# Patient Record
Sex: MALE | Race: WHITE
[De-identification: names, ages, dates, MRNs, and addresses within clinical notes are randomized per-mention and may not be internally consistent; named-entity substitution may affect disease eponyms.]

---

## 2023-01-19 ENCOUNTER — HOSPITAL ENCOUNTER (INPATIENT)
Dept: HOSPITAL 12 - ER | Age: 73
LOS: 19 days | Discharge: ANOTHER HOSPITAL | DRG: 885 | End: 2023-02-07
Payer: MEDICARE

## 2023-01-19 VITALS — BODY MASS INDEX: 14.7 KG/M2 | WEIGHT: 97 LBS | HEIGHT: 68 IN

## 2023-01-19 VITALS — SYSTOLIC BLOOD PRESSURE: 98 MMHG | DIASTOLIC BLOOD PRESSURE: 64 MMHG

## 2023-01-19 DIAGNOSIS — Z91.51: ICD-10-CM

## 2023-01-19 DIAGNOSIS — R94.6: ICD-10-CM

## 2023-01-19 DIAGNOSIS — F31.5: Primary | ICD-10-CM

## 2023-01-19 DIAGNOSIS — F41.1: ICD-10-CM

## 2023-01-19 DIAGNOSIS — R45.851: ICD-10-CM

## 2023-01-19 DIAGNOSIS — Z87.828: ICD-10-CM

## 2023-01-19 DIAGNOSIS — E86.0: ICD-10-CM

## 2023-01-19 DIAGNOSIS — Z79.899: ICD-10-CM

## 2023-01-19 DIAGNOSIS — D64.9: ICD-10-CM

## 2023-01-19 DIAGNOSIS — E83.39: ICD-10-CM

## 2023-01-19 DIAGNOSIS — M89.8X9: ICD-10-CM

## 2023-01-19 DIAGNOSIS — R41.9: ICD-10-CM

## 2023-01-19 DIAGNOSIS — E44.0: ICD-10-CM
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
## 2023-01-19 DIAGNOSIS — Z88.0: ICD-10-CM

## 2023-01-19 DIAGNOSIS — N17.9: ICD-10-CM

## 2023-01-19 DIAGNOSIS — F19.10: ICD-10-CM

## 2023-01-19 DIAGNOSIS — Z20.822: ICD-10-CM

## 2023-01-19 DIAGNOSIS — R62.7: ICD-10-CM

## 2023-01-19 DIAGNOSIS — T43.505A: ICD-10-CM

## 2023-01-19 DIAGNOSIS — F10.10: ICD-10-CM

## 2023-01-19 DIAGNOSIS — J44.9: ICD-10-CM

## 2023-01-19 DIAGNOSIS — I95.2: ICD-10-CM

## 2023-01-19 DIAGNOSIS — K21.9: ICD-10-CM

## 2023-01-19 DIAGNOSIS — Z91.14: ICD-10-CM

## 2023-01-19 DIAGNOSIS — Z87.19: ICD-10-CM

## 2023-01-19 DIAGNOSIS — Z86.19: ICD-10-CM

## 2023-01-19 LAB
ALP SERPL-CCNC: 60 U/L (ref 50–136)
ALT SERPL W/O P-5'-P-CCNC: 12 U/L (ref 16–63)
APAP SERPL-MCNC: < 10 UG/ML (ref 10–30)
AST SERPL-CCNC: 12 U/L (ref 15–37)
BILIRUB SERPL-MCNC: 1.3 MG/DL (ref 0.2–1)
BUN SERPL-MCNC: 19 MG/DL (ref 7–18)
CHLORIDE SERPL-SCNC: 103 MMOL/L (ref 98–107)
CO2 SERPL-SCNC: 29 MMOL/L (ref 21–32)
CREAT SERPL-MCNC: 0.9 MG/DL (ref 0.6–1.3)
ETHANOL SERPL-MCNC: < 3 MG/DL (ref 0–0)
GLUCOSE SERPL-MCNC: 100 MG/DL (ref 74–106)
HCT VFR BLD AUTO: 33.4 % (ref 36.7–47.1)
MCH RBC QN AUTO: 30.4 UUG (ref 23.8–33.4)
MCV RBC AUTO: 88.9 FL (ref 73–96.2)
PLATELET # BLD AUTO: 168 K/UL (ref 152–348)
POTASSIUM SERPL-SCNC: 4 MMOL/L (ref 3.5–5.1)
WS STN SPEC: 6.6 G/DL (ref 6.4–8.2)

## 2023-01-19 PROCEDURE — A6209 FOAM DRSG <=16 SQ IN W/O BDR: HCPCS

## 2023-01-19 PROCEDURE — A6213 FOAM DRG >16<=48 SQ IN W/BDR: HCPCS

## 2023-01-19 PROCEDURE — A4663 DIALYSIS BLOOD PRESSURE CUFF: HCPCS

## 2023-01-19 PROCEDURE — G0480 DRUG TEST DEF 1-7 CLASSES: HCPCS

## 2023-01-19 NOTE — NUR
Patient taken to MHU via wheelchair in stable condition with personal 
belongings. Patient in stable conditions, no signs of distress. Pilar KIM aware 
of patients arrival.

## 2023-01-20 VITALS — SYSTOLIC BLOOD PRESSURE: 102 MMHG | DIASTOLIC BLOOD PRESSURE: 59 MMHG

## 2023-01-20 VITALS — SYSTOLIC BLOOD PRESSURE: 95 MMHG | DIASTOLIC BLOOD PRESSURE: 73 MMHG

## 2023-01-20 VITALS — DIASTOLIC BLOOD PRESSURE: 58 MMHG | SYSTOLIC BLOOD PRESSURE: 92 MMHG

## 2023-01-20 LAB
CHOLEST SERPL-MCNC: 174 MG/DL (ref ?–200)
HDLC SERPL-MCNC: 49 MG/DL (ref 40–60)
TRIGL SERPL-MCNC: 86 MG/DL (ref 30–150)

## 2023-01-20 RX ADMIN — FERROUS SULFATE TAB 325 MG (65 MG ELEMENTAL FE) SCH MG: 325 (65 FE) TAB at 20:24

## 2023-01-20 RX ADMIN — DIVALPROEX SODIUM SCH MG: 125 CAPSULE ORAL at 16:46

## 2023-01-20 RX ADMIN — Medication SCH MG: at 12:49

## 2023-01-20 RX ADMIN — DIVALPROEX SODIUM SCH MG: 125 CAPSULE ORAL at 12:48

## 2023-01-20 NOTE — NUR
ADMISSION NOTE:

ADMITTED EARLIER A 72 YEARS OLD MALE TO [facility] ON A 5150 FOR DTS. PATIENT LIVES 
AT AN ASSISTED LIVING CALLED "[facility]" IN [place]. PER RECORDS, PATIENTS WAS 
TRANSPORTED TO [facility] BY FACILITY D/T DEPRESSION PSYCHOSIS AND SI. PER HOLD, 
PATIENT IS DEPRESSED WITH FLAT AFFECT AND HE THINKS THAT HE HAS BOMB INSIDE HIM. HE IS 
SUICIDAL WITH A PLAN TO OD. HIS HOLD WILL  ON 23 AT 0015.

UPON ADMISSION PATIENT IS A/O X 2 TO 3. HE REFLECTS WHAT IS WRITTEN ON THE HOLD. PATIENT 
STATED, "I AM SAD AND DEPRESSED, I HEAR VOICES BUT I AM NOT SURE WHAT THEY ARE SAYING. I 
DON'T WANT TO HARM MYSELF. I AM SAD". PATIENT IS ABLE TO VERBALLY CFS. HIS DENIAL IS 
CONVINCING.

FACE TO FACE ASSESSMENT WAS DONE, PATIENT WAS GIVEN HIS ADVISEMENT AS WELL AS HIS BOOKLET 
FOR PATIENT'S RIGHTS WHEN IN MENTAL HEALTH FACILITIES. 

PATIENT WAS COOPERATIVE WITH THE ADMISSION PROCESS. HE WAS INFORMED OF THE UNIT RULES, ROOM 
AND ROOMMATE. WILL CONTINUE TO MONITOR.

## 2023-01-20 NOTE — NUR
Firearms Report:



 completed and submitted a [employer] firearms report for 5150 a danger to himself. A 
copy of report has been placed in patient chart.

## 2023-01-20 NOTE — NUR
SAUL Initial Discharge Note:



Pt currently resides at an assisted living located at [address] [place] [address] ([phone]). SAUL will continue to work on 

contacting pt's assisted living to discuss pt's discharge plan. SAUL will continue to work 
with pt, family and MD to ensure a safe and proper discharge plan.

## 2023-01-20 NOTE — NUR
RECEIVED PT IN ROOM ISOLATIVE WITH A FLAT AFFECTIVE DENIES SUCIDAL IDEATION  BUT STAYS ONLY 
IN HIS ROOM NO SIGNS OF DISTRESS NOTED DENIES PAIN. BUT IS MEDICATION COMPLIANT.

## 2023-01-21 VITALS — SYSTOLIC BLOOD PRESSURE: 98 MMHG | DIASTOLIC BLOOD PRESSURE: 63 MMHG

## 2023-01-21 VITALS — SYSTOLIC BLOOD PRESSURE: 110 MMHG | DIASTOLIC BLOOD PRESSURE: 54 MMHG

## 2023-01-21 VITALS — DIASTOLIC BLOOD PRESSURE: 63 MMHG | SYSTOLIC BLOOD PRESSURE: 92 MMHG

## 2023-01-21 LAB
BUN SERPL-MCNC: 25 MG/DL (ref 7–18)
CHLORIDE SERPL-SCNC: 102 MMOL/L (ref 98–107)
CO2 SERPL-SCNC: 26 MMOL/L (ref 21–32)
CREAT SERPL-MCNC: 0.9 MG/DL (ref 0.6–1.3)
GLUCOSE SERPL-MCNC: 73 MG/DL (ref 74–106)
HCT VFR BLD AUTO: 35.3 % (ref 36.7–47.1)
MAGNESIUM SERPL-MCNC: 2.2 MG/DL (ref 1.8–2.4)
MCH RBC QN AUTO: 29.8 UUG (ref 23.8–33.4)
MCV RBC AUTO: 90.4 FL (ref 73–96.2)
PHOSPHATE SERPL-MCNC: 2.7 MG/DL (ref 2.5–4.9)
PLATELET # BLD AUTO: 54 K/UL (ref 152–348)
POTASSIUM SERPL-SCNC: 3.9 MMOL/L (ref 3.5–5.1)
TSH SERPL DL<=0.005 MIU/L-ACNC: 5.42 MIU/ML (ref 0.36–3.74)

## 2023-01-21 RX ADMIN — PANTOPRAZOLE SODIUM SCH MG: 40 TABLET, DELAYED RELEASE ORAL at 06:03

## 2023-01-21 RX ADMIN — DIVALPROEX SODIUM SCH MG: 125 CAPSULE ORAL at 08:22

## 2023-01-21 RX ADMIN — Medication SCH ML: at 16:19

## 2023-01-21 RX ADMIN — DIVALPROEX SODIUM SCH MG: 125 CAPSULE ORAL at 16:18

## 2023-01-21 RX ADMIN — Medication SCH MG: at 12:12

## 2023-01-21 RX ADMIN — DIVALPROEX SODIUM SCH MG: 125 CAPSULE ORAL at 12:12

## 2023-01-21 RX ADMIN — THERA TABS SCH UDTAB: TAB at 08:22

## 2023-01-21 RX ADMIN — FERROUS SULFATE TAB 325 MG (65 MG ELEMENTAL FE) SCH MG: 325 (65 FE) TAB at 21:32

## 2023-01-21 RX ADMIN — FERROUS SULFATE TAB 325 MG (65 MG ELEMENTAL FE) SCH MG: 325 (65 FE) TAB at 08:22

## 2023-01-21 NOTE — NUR
RECEIVED PATIENT IN HIS ROOM IN BED. HE IS NOTED AWAKE A/O X 2 TO 3. HIS MOOD IS LOW, AFFECT 
IS FLAT. HE APPEARS DEPRESSED. HE IS WITHDRAWN AND ISOLATIVE. PATIENT IS GUARDED. MINIMAL 
WORDS AND INTERACTION WITH THIS WRITER. HOWEVER, HE IS ABLE TO MAKE EYE CONTACT AND WHEN 
ASKED IF HE WAS HAVING SI HE STATED, "NO". HE ALSO DENIED HI/VH/AH. PATIENT IS REASSURED FOR 
HIS SAFETY. SAFETY AND FALL PRECAUTIONS ARE IN PLACE. HIS V/S ARE STABLE. HE WAS GIVEN PO 
FLUIDS AND SNACKS, BUT HE ONLY HAD FEW SIPS OF ENSURE. PATIENT WAS ENCOURAGE TO EAT MORE AND 
TO GET OUT OF THE ROOM AND WALK AROUND AND PARTICIPATE IN ACTIVITIES. ALL HIS NEEDS ARE MET. 
WILL CONTINUE TO MONITOR.

## 2023-01-21 NOTE — NUR
patirnt remain calm and cooperative with meds and care. slept 7.15 hrs through the night. no 
agitation noted. resting in bed comfortably. continue plan of care.

## 2023-01-21 NOTE — NUR
GPS: Nursing Notes: Mood Disturbance: Depression:

Patient is awake and responding to his name, isolative and withdrawn in his room, no 
interactions with peers, low energy level, compliant with his medications, poor appetite, 
resistant with nursing care, unable to formulate a viable plan for self care, verbally 
katie for safety, denies SI at this time, continue to monitor for safety, refusing to 
participate in therapeutic groups, continue with treatment plan.l

## 2023-01-22 VITALS — SYSTOLIC BLOOD PRESSURE: 90 MMHG | DIASTOLIC BLOOD PRESSURE: 62 MMHG

## 2023-01-22 VITALS — SYSTOLIC BLOOD PRESSURE: 92 MMHG | DIASTOLIC BLOOD PRESSURE: 54 MMHG

## 2023-01-22 VITALS — SYSTOLIC BLOOD PRESSURE: 90 MMHG | DIASTOLIC BLOOD PRESSURE: 52 MMHG

## 2023-01-22 LAB
BUN SERPL-MCNC: 29 MG/DL (ref 7–18)
CHLORIDE SERPL-SCNC: 103 MMOL/L (ref 98–107)
CO2 SERPL-SCNC: 26 MMOL/L (ref 21–32)
CREAT SERPL-MCNC: 1.1 MG/DL (ref 0.6–1.3)
GLUCOSE SERPL-MCNC: 80 MG/DL (ref 74–106)
HCT VFR BLD AUTO: 30.8 % (ref 36.7–47.1)
MAGNESIUM SERPL-MCNC: 2.3 MG/DL (ref 1.8–2.4)
MCH RBC QN AUTO: 29.9 UUG (ref 23.8–33.4)
MCV RBC AUTO: 90.2 FL (ref 73–96.2)
PHOSPHATE SERPL-MCNC: 2.7 MG/DL (ref 2.5–4.9)
PLATELET # BLD AUTO: 214 K/UL (ref 152–348)
POTASSIUM SERPL-SCNC: 4 MMOL/L (ref 3.5–5.1)

## 2023-01-22 RX ADMIN — DIVALPROEX SODIUM SCH MG: 125 CAPSULE ORAL at 13:10

## 2023-01-22 RX ADMIN — Medication SCH ML: at 08:37

## 2023-01-22 RX ADMIN — MAGNESIUM HYDROXIDE PRN ML: 400 SUSPENSION ORAL at 16:32

## 2023-01-22 RX ADMIN — Medication SCH MG: at 13:10

## 2023-01-22 RX ADMIN — FERROUS SULFATE TAB 325 MG (65 MG ELEMENTAL FE) SCH MG: 325 (65 FE) TAB at 20:17

## 2023-01-22 RX ADMIN — Medication PRN MG: at 16:32

## 2023-01-22 RX ADMIN — Medication SCH ML: at 16:32

## 2023-01-22 RX ADMIN — DIVALPROEX SODIUM SCH MG: 125 CAPSULE ORAL at 08:36

## 2023-01-22 RX ADMIN — PANTOPRAZOLE SODIUM SCH MG: 40 TABLET, DELAYED RELEASE ORAL at 06:16

## 2023-01-22 RX ADMIN — DIVALPROEX SODIUM SCH MG: 125 CAPSULE ORAL at 16:32

## 2023-01-22 RX ADMIN — FERROUS SULFATE TAB 325 MG (65 MG ELEMENTAL FE) SCH MG: 325 (65 FE) TAB at 08:36

## 2023-01-22 RX ADMIN — THERA TABS SCH UDTAB: TAB at 08:36

## 2023-01-22 NOTE — NUR
GPS: Nursing Notes: Mood Disturbance: Depression:

Patient is awake and responding to his name, depressed mood and flat affect, isolative and 
withdrawn in his room, low energy level, poor appetite, no interactions with staff or peers, 
resistant with nursing care, poor grooming, unkempt appearance, unable to formulate a viable 
plan for self care, compliant with his medications, denies SI, continue to monitor for 
safety, continue with treatment plan.

## 2023-01-22 NOTE — NUR
RECEIVED PATIENT IN HIS ROOM IN BED. HE IS NOTED AWAKE A/O X 2 TO 3. HE IS ABLE TO 
VERBALIZED HIS FEELINGS. HE APPEARS DEPRESSED. HE CONTINUE WITHDRAWN AND ISOLATIVE. PATIENT 
IS SUSPICIOUS, POOR APPETITE, IMPAIRED JUDGMENT. WHEN GIVEN ENSURED HE STATED, "THIS ENSURE 
IS NOT SAFE TO DRINK". PATIENT WAS REDIRECTED AND ORIENTED TO REALITY, YET REFUSED TO DRINK 
EVEN WHEN ANOTHER ENSURE WERE OFFERED. HIS AFFECT IS FLAT, MOOD IS LOW. PATIENT STATED THAT 
HE DOES HEAR VOICES BUT HE CAN'T TELL WHAT THEY ARE SAYING.  HE IS REASSURED FOR HIS SAFETY. 
SAFETY AND FALL PRECAUTIONS ARE IN PLACE. HIS V/S ARE STABLE. HE WAS GIVEN PO FLUIDS AND 
SNACKS, BUT HE ONLY HAD ONE CRACKER. ALL HIS NEEDS ARE MET. WILL CONTINUE TO MONITOR.

## 2023-01-23 VITALS — DIASTOLIC BLOOD PRESSURE: 58 MMHG | SYSTOLIC BLOOD PRESSURE: 91 MMHG

## 2023-01-23 VITALS — SYSTOLIC BLOOD PRESSURE: 95 MMHG | DIASTOLIC BLOOD PRESSURE: 55 MMHG

## 2023-01-23 VITALS — SYSTOLIC BLOOD PRESSURE: 117 MMHG | DIASTOLIC BLOOD PRESSURE: 96 MMHG

## 2023-01-23 LAB
ALP SERPL-CCNC: 62 U/L (ref 50–136)
ALT SERPL W/O P-5'-P-CCNC: 15 U/L (ref 16–63)
AST SERPL-CCNC: 14 U/L (ref 15–37)
BILIRUB SERPL-MCNC: 1 MG/DL (ref 0.2–1)
BUN SERPL-MCNC: 24 MG/DL (ref 7–18)
CHLORIDE SERPL-SCNC: 101 MMOL/L (ref 98–107)
CO2 SERPL-SCNC: 19 MMOL/L (ref 21–32)
CREAT SERPL-MCNC: 1.2 MG/DL (ref 0.6–1.3)
GLUCOSE SERPL-MCNC: 89 MG/DL (ref 74–106)
MAGNESIUM SERPL-MCNC: 2.6 MG/DL (ref 1.8–2.4)
PHOSPHATE SERPL-MCNC: 2.8 MG/DL (ref 2.5–4.9)
POTASSIUM SERPL-SCNC: 3.8 MMOL/L (ref 3.5–5.1)
WS STN SPEC: 7.1 G/DL (ref 6.4–8.2)

## 2023-01-23 RX ADMIN — FERROUS SULFATE TAB 325 MG (65 MG ELEMENTAL FE) SCH MG: 325 (65 FE) TAB at 20:36

## 2023-01-23 RX ADMIN — THERA TABS SCH UDTAB: TAB at 08:40

## 2023-01-23 RX ADMIN — DIVALPROEX SODIUM SCH MG: 125 CAPSULE ORAL at 08:40

## 2023-01-23 RX ADMIN — LORAZEPAM SCH MG: 0.5 TABLET ORAL at 17:12

## 2023-01-23 RX ADMIN — LORAZEPAM SCH MG: 0.5 TABLET ORAL at 11:14

## 2023-01-23 RX ADMIN — FERROUS SULFATE TAB 325 MG (65 MG ELEMENTAL FE) SCH MG: 325 (65 FE) TAB at 08:40

## 2023-01-23 RX ADMIN — SENNOSIDES PRN TAB: 8.6 TABLET, COATED ORAL at 04:13

## 2023-01-23 RX ADMIN — PANTOPRAZOLE SODIUM SCH MG: 40 TABLET, DELAYED RELEASE ORAL at 07:02

## 2023-01-23 RX ADMIN — VENLAFAXINE HYDROCHLORIDE SCH MG: 75 CAPSULE, EXTENDED RELEASE ORAL at 12:48

## 2023-01-23 RX ADMIN — DIVALPROEX SODIUM SCH MG: 125 CAPSULE ORAL at 12:48

## 2023-01-23 RX ADMIN — Medication SCH ML: at 17:00

## 2023-01-23 RX ADMIN — TEMAZEPAM PRN MG: 7.5 CAPSULE ORAL at 20:36

## 2023-01-23 RX ADMIN — DIVALPROEX SODIUM SCH MG: 125 CAPSULE ORAL at 17:12

## 2023-01-23 RX ADMIN — LORAZEPAM SCH MG: 0.5 TABLET ORAL at 12:48

## 2023-01-23 RX ADMIN — RISPERIDONE SCH MG: 0.5 TABLET, ORALLY DISINTEGRATING ORAL at 12:48

## 2023-01-23 RX ADMIN — Medication SCH ML: at 08:41

## 2023-01-23 RX ADMIN — RISPERIDONE SCH MG: 0.5 TABLET, ORALLY DISINTEGRATING ORAL at 17:12

## 2023-01-23 NOTE — NUR
GPS: Nursing Notes: Mood Disturbance: Depression:

Patient is awake and responding to his name, depressed mood and flat affect, resistant with 
nursing care, low energy level, unable to formulate a viable plan for self care, isolative 
and withdrawn, present in therapeutic groups, but not participating, gets easily irritable 
when redirected, resistant with nursing care, continue with treatment plan.

## 2023-01-24 VITALS — DIASTOLIC BLOOD PRESSURE: 65 MMHG | SYSTOLIC BLOOD PRESSURE: 105 MMHG

## 2023-01-24 VITALS — SYSTOLIC BLOOD PRESSURE: 101 MMHG | DIASTOLIC BLOOD PRESSURE: 64 MMHG

## 2023-01-24 VITALS — SYSTOLIC BLOOD PRESSURE: 99 MMHG | DIASTOLIC BLOOD PRESSURE: 57 MMHG

## 2023-01-24 LAB
CK SERPL-CCNC: 62 U/L (ref 39–308)
HCT VFR BLD AUTO: 32.4 % (ref 36.7–47.1)
MAGNESIUM SERPL-MCNC: 2.4 MG/DL (ref 1.8–2.4)
MCH RBC QN AUTO: 30 UUG (ref 23.8–33.4)
MCV RBC AUTO: 88.2 FL (ref 73–96.2)
PHOSPHATE SERPL-MCNC: 2.3 MG/DL (ref 2.5–4.9)
PLATELET # BLD AUTO: 246 K/UL (ref 152–348)

## 2023-01-24 RX ADMIN — LORAZEPAM SCH MG: 0.5 TABLET ORAL at 16:56

## 2023-01-24 RX ADMIN — PANTOPRAZOLE SODIUM SCH MG: 40 TABLET, DELAYED RELEASE ORAL at 06:19

## 2023-01-24 RX ADMIN — FERROUS SULFATE TAB 325 MG (65 MG ELEMENTAL FE) SCH MG: 325 (65 FE) TAB at 21:09

## 2023-01-24 RX ADMIN — FERROUS SULFATE TAB 325 MG (65 MG ELEMENTAL FE) SCH MG: 325 (65 FE) TAB at 09:32

## 2023-01-24 RX ADMIN — THERA TABS SCH UDTAB: TAB at 09:32

## 2023-01-24 RX ADMIN — LORAZEPAM SCH MG: 0.5 TABLET ORAL at 12:57

## 2023-01-24 RX ADMIN — Medication PRN MG: at 21:09

## 2023-01-24 RX ADMIN — RISPERIDONE SCH MG: 0.5 TABLET, ORALLY DISINTEGRATING ORAL at 12:57

## 2023-01-24 RX ADMIN — LORAZEPAM SCH MG: 0.5 TABLET ORAL at 09:32

## 2023-01-24 RX ADMIN — DIVALPROEX SODIUM SCH MG: 125 CAPSULE ORAL at 09:32

## 2023-01-24 RX ADMIN — RISPERIDONE SCH MG: 0.5 TABLET, ORALLY DISINTEGRATING ORAL at 09:32

## 2023-01-24 RX ADMIN — Medication SCH ML: at 16:58

## 2023-01-24 RX ADMIN — Medication SCH ML: at 09:33

## 2023-01-24 RX ADMIN — DIVALPROEX SODIUM SCH MG: 125 CAPSULE ORAL at 16:55

## 2023-01-24 RX ADMIN — RISPERIDONE SCH MG: 0.5 TABLET, ORALLY DISINTEGRATING ORAL at 16:55

## 2023-01-24 RX ADMIN — DIVALPROEX SODIUM SCH MG: 125 CAPSULE ORAL at 12:57

## 2023-01-24 RX ADMIN — VENLAFAXINE HYDROCHLORIDE SCH MG: 75 CAPSULE, EXTENDED RELEASE ORAL at 12:57

## 2023-01-24 NOTE — NUR
GPS: Nursing Notes: Mood Disturbance: Depression:

Patient is awake and responding to his name, A/Ox3, cooperative with nursing care, his 
appetite has increased, eating his meals, depressed mood and blunted affect, selectively 
compliant with his medications at 1 PM, stated "I think.. I am getting too many pills.." 
Explained the pros and cons of medications, but continue to be selectively compliant with 
Effexor, unable to formulate a viable  plan for self care, ambulatory with fww, stated 
"Those pills in the morning made me weak..", participate in therapeutic groups, needs 
assistance with ADL's, verbally katie for safety, denies SI, continue to monitor for 
safety, continue with treatment plan.

## 2023-01-24 NOTE — NUR
SAUL Family Contact:



SAUL contacted pt' niece, Magdalena ([phone]) and left a voicemail for a call back. SAUL will 
continue to follow-up with Magdalena.

## 2023-01-24 NOTE — NUR
GPS NOTES: Received pt. in the gerichair for safety near the nursing station, he is A&0x3, 
he is pleasant upon approached. He is thin and frail looking, attempt to stand but unable 
d/t weakness. Patient reminded of his capabilities. Patient offered snacks and fluids, he is 
compliant and consumes 100% of snacks provided. Patient needs assistance to his ADL's. 
Patient denies SI and contract safety strategies with the writer. He is med compliant. 
Temazepam given. Effective. Patient slept well during shift. No distress noted. Closely 
monitoring observed.

## 2023-01-25 VITALS — DIASTOLIC BLOOD PRESSURE: 61 MMHG | SYSTOLIC BLOOD PRESSURE: 90 MMHG

## 2023-01-25 VITALS — SYSTOLIC BLOOD PRESSURE: 90 MMHG | DIASTOLIC BLOOD PRESSURE: 50 MMHG

## 2023-01-25 VITALS — DIASTOLIC BLOOD PRESSURE: 66 MMHG | SYSTOLIC BLOOD PRESSURE: 104 MMHG

## 2023-01-25 LAB
ALBUMIN SERPL ELPH-MCNC: 3.4 G/DL (ref 2.9–4.4)
ALBUMIN/GLOB SERPL: 1.4 {RATIO} (ref 0.7–1.7)
ALPHA1 GLOB SERPL ELPH-MCNC: 0.2 G/DL (ref 0–0.4)
ALPHA2 GLOB SERPL ELPH-MCNC: 0.7 G/DL (ref 0.4–1)
B-GLOBULIN SERPL ELPH-MCNC: 0.8 G/DL (ref 0.7–1.3)
GAMMA GLOB SERPL ELPH-MCNC: 0.7 G/DL (ref 0.4–1.8)
GLOBULIN SER CALC-MCNC: 2.4 G/DL (ref 2.2–3.9)

## 2023-01-25 RX ADMIN — Medication SCH ML: at 17:42

## 2023-01-25 RX ADMIN — DIVALPROEX SODIUM SCH MG: 125 CAPSULE ORAL at 13:20

## 2023-01-25 RX ADMIN — PANTOPRAZOLE SODIUM SCH MG: 40 TABLET, DELAYED RELEASE ORAL at 06:18

## 2023-01-25 RX ADMIN — Medication SCH ML: at 09:29

## 2023-01-25 RX ADMIN — DIVALPROEX SODIUM SCH MG: 125 CAPSULE ORAL at 17:42

## 2023-01-25 RX ADMIN — FERROUS SULFATE TAB 325 MG (65 MG ELEMENTAL FE) SCH MG: 325 (65 FE) TAB at 20:15

## 2023-01-25 RX ADMIN — RISPERIDONE SCH MG: 0.5 TABLET, ORALLY DISINTEGRATING ORAL at 17:42

## 2023-01-25 RX ADMIN — LORAZEPAM SCH MG: 0.5 TABLET ORAL at 13:20

## 2023-01-25 RX ADMIN — LORAZEPAM SCH MG: 0.5 TABLET ORAL at 17:42

## 2023-01-25 RX ADMIN — VENLAFAXINE HYDROCHLORIDE SCH MG: 75 CAPSULE, EXTENDED RELEASE ORAL at 13:20

## 2023-01-25 RX ADMIN — LORAZEPAM SCH MG: 0.5 TABLET ORAL at 09:26

## 2023-01-25 RX ADMIN — RISPERIDONE SCH MG: 0.5 TABLET, ORALLY DISINTEGRATING ORAL at 13:20

## 2023-01-25 RX ADMIN — FERROUS SULFATE TAB 325 MG (65 MG ELEMENTAL FE) SCH MG: 325 (65 FE) TAB at 09:27

## 2023-01-25 RX ADMIN — RISPERIDONE SCH MG: 0.5 TABLET, ORALLY DISINTEGRATING ORAL at 09:27

## 2023-01-25 RX ADMIN — DIVALPROEX SODIUM SCH MG: 125 CAPSULE ORAL at 09:28

## 2023-01-25 RX ADMIN — THERA TABS SCH UDTAB: TAB at 09:27

## 2023-01-25 NOTE — NUR
GPS: Nursing Notes: Mood Disturbance: Depression:

Receive patient awake in room,response to his name A/Ox3. Patient continent and incontinent, 
needs maximum assistance with ADL's. Patient can walk with 1 person assist and a walker but 
uses a wheelchair to wheel himself. Patient is depress, stays in his room and refuses to 
participate in group activities.Patient is compliant with medications but needs a lot of 
prompting. Patient stayed " I have fecal matter and this medications are going to make it 
worse", Sorry It was nice knowing you I am going to die with this medications".Patient is 
unable to formulate a viable plan for self care.Unkept appearance.Continue to monitor for 
safety, continue with treatment plan

## 2023-01-25 NOTE — NUR
RECEIVED PATIENT IN HIS ROOM IN BED. HE IS NOTED SLEEPING BUT EASILY AROUSABLE. PATIENT 
NOTED A/O X 2. HE CONTINUE WITHDRAWN AND ISOLATIVE. PT APPEARS DEPRESSED. WHEN INTERVIEW, HE 
DENIED SI/HI/VH/AH. HE STATED, "I AM FEELING SLEEPY TODAY". PATIENT'S V/S ARE STABLE. HE IS 
IN NO DISTRESS. HE IS REASSURED FOR HIS SAFETY. SAFETY AND FALL PRECAUTIONS ARE IN PLACE. HE 
WAS GIVEN PO FLUIDS AND SNACKS, AND HE WAS ABLE TO HAVE FEW SNACKS. HIS APPETITE IS 
IMPROVING. ALL HIS NEEDS ARE MET. WILL CONTINUE TO MONITOR.

## 2023-01-25 NOTE — NUR
Patient was c/o different things at the start of the shift . Constipation, food, 
incontinence, inability to empty the bladder, overactive bladder and so on. This writer 
brought the patient out of the room in a josé- chair and shaved the patient, plus encouraged 
oral hydration with assistance. The patient did verbalize not wanting to live on minute , 
then stated " I do not want to die, I want to keep living." Many times during the 
conversation ,with this writer, the patient contradicted himself. Safety Stratiges are in 
place at this time. A verbal contract for safety was made between the patient and this 
writer. Continuing to assist the patient with all ADLs and feedings. The patient has been 
compliant with medications.

## 2023-01-26 VITALS — SYSTOLIC BLOOD PRESSURE: 88 MMHG | DIASTOLIC BLOOD PRESSURE: 74 MMHG

## 2023-01-26 VITALS — DIASTOLIC BLOOD PRESSURE: 55 MMHG | SYSTOLIC BLOOD PRESSURE: 85 MMHG

## 2023-01-26 VITALS — SYSTOLIC BLOOD PRESSURE: 78 MMHG | DIASTOLIC BLOOD PRESSURE: 48 MMHG

## 2023-01-26 VITALS — SYSTOLIC BLOOD PRESSURE: 83 MMHG | DIASTOLIC BLOOD PRESSURE: 51 MMHG

## 2023-01-26 LAB
BUN SERPL-MCNC: 28 MG/DL (ref 7–18)
CHLORIDE SERPL-SCNC: 102 MMOL/L (ref 98–107)
CO2 SERPL-SCNC: 31 MMOL/L (ref 21–32)
CREAT SERPL-MCNC: 0.9 MG/DL (ref 0.6–1.3)
GLUCOSE SERPL-MCNC: 108 MG/DL (ref 74–106)
HCT VFR BLD AUTO: 28.3 % (ref 36.7–47.1)
MAGNESIUM SERPL-MCNC: 1.8 MG/DL (ref 1.8–2.4)
MCH RBC QN AUTO: 30.4 UUG (ref 23.8–33.4)
MCV RBC AUTO: 88.6 FL (ref 73–96.2)
PHOSPHATE SERPL-MCNC: 1.8 MG/DL (ref 2.5–4.9)
PLATELET # BLD AUTO: 172 K/UL (ref 152–348)
POTASSIUM SERPL-SCNC: 3.6 MMOL/L (ref 3.5–5.1)

## 2023-01-26 RX ADMIN — DIVALPROEX SODIUM SCH MG: 125 CAPSULE ORAL at 12:57

## 2023-01-26 RX ADMIN — Medication SCH ML: at 18:03

## 2023-01-26 RX ADMIN — RISPERIDONE SCH MG: 0.5 TABLET, ORALLY DISINTEGRATING ORAL at 12:58

## 2023-01-26 RX ADMIN — VENLAFAXINE HYDROCHLORIDE SCH MG: 75 CAPSULE, EXTENDED RELEASE ORAL at 12:58

## 2023-01-26 RX ADMIN — PANTOPRAZOLE SODIUM SCH MG: 40 TABLET, DELAYED RELEASE ORAL at 07:25

## 2023-01-26 RX ADMIN — FERROUS SULFATE TAB 325 MG (65 MG ELEMENTAL FE) SCH MG: 325 (65 FE) TAB at 09:05

## 2023-01-26 RX ADMIN — Medication SCH ML: at 09:07

## 2023-01-26 RX ADMIN — LORAZEPAM SCH MG: 0.5 TABLET ORAL at 09:04

## 2023-01-26 RX ADMIN — RISPERIDONE SCH MG: 0.5 TABLET, ORALLY DISINTEGRATING ORAL at 09:05

## 2023-01-26 RX ADMIN — LORAZEPAM SCH MG: 0.5 TABLET ORAL at 12:57

## 2023-01-26 RX ADMIN — Medication PRN MG: at 22:07

## 2023-01-26 RX ADMIN — DIVALPROEX SODIUM SCH MG: 125 CAPSULE ORAL at 09:04

## 2023-01-26 RX ADMIN — THERA TABS SCH UDTAB: TAB at 09:05

## 2023-01-26 RX ADMIN — FERROUS SULFATE TAB 325 MG (65 MG ELEMENTAL FE) SCH MG: 325 (65 FE) TAB at 22:08

## 2023-01-26 RX ADMIN — LORAZEPAM SCH MG: 0.5 TABLET ORAL at 17:00

## 2023-01-26 RX ADMIN — DIVALPROEX SODIUM SCH MG: 125 CAPSULE ORAL at 17:00

## 2023-01-26 RX ADMIN — RISPERIDONE SCH MG: 0.5 TABLET, ORALLY DISINTEGRATING ORAL at 17:00

## 2023-01-26 NOTE — NUR
Received Patient awake sitting in bed,depressed,blunted affect. Patient is continent with 
care,needs maximum assistant with ADLs,unkept appearance.Patient is med compliant but needs 
prompting.Patient does not attend group activities but did had breakfast in the dinning 
room. Continue to monitor for safety, Continue with treatment plan.

## 2023-01-26 NOTE — NUR
IV INSERTION WAS DONE WITH 22G IV NEEDLE ON RIGHT LOWER FOREARM FOR AND EARLIER ORDER TO 
INFUSED 500CC NS IV BOLUS. PATIENT WAS COOPERATIVE. HE TOLERATED INSERTION WELL. STILL 
MISSING IV POLL. CALLED MED SURG FLOOR BUT THEY STARED THEY DON'T HAVE ONE. CALL ER AND THEY 
DON'T HAVE ONE. CALLED HS EXTENSION AND STAFF STATED THAT HE WILL LET HER KNOW. WILL 
CONTINUE TO MONITOR.

## 2023-01-26 NOTE — NUR
Pt found lying on the floor in the doorway of another pt room. Pt appears confused and 
disorganized, stated that he was trying to get up to walk to his room. Noted hypotensive 
with bp of 83/55, noted symptomatic with mild vertigo. Noted skin tear abrasions to left 
lateral portions of face. No excessive bleeding noted. Pt denies pain or discomfort at this 
time. Called [facility] and received order for state head CT to r/o bleeding and x1 NS 
500cc bolus. Order noted and will carry out. Called and informed pt's point of contact his 
niece Magdalena, and she is notified. Called house supervisor Naya and she is notified as 
well. Called. Dr. Yeager and she is notifed. In no acute distress.

## 2023-01-26 NOTE — NUR
CALLED HS. SHE STATED TO GO CHECKS ICU FOR THEY MAY HAVE AN IV POLE. WENT TO ICU AND AN IV 
INFUSION PUMP WAS FOUND. WILL BRING TO UNIT, ONCE IS DISINFECTED, TO START IV BOLUS WITH 
500CC NS AS PER MD ORDERED. WILL CONTINUE TO MONITOR CLOSELY.

## 2023-01-26 NOTE — NUR
500CC NS IV BOLUS IS DONE, PATIENT TOLERATED WELL. V/S: B/P 88/74 AND PULSE 68BPM. PT IN NO 
DISTRESS. WILL CONTINUE TO MONITOR.

## 2023-01-26 NOTE — NUR
PATIENT WAS GIVEN DULCOLAX PO PRN FOR CONSTIPATION. HE WAS ASSISTED TO THE BATHROOM WHERE HE 
VOIDED AND HE WAS ASSISTED TO HIS BED. BED ALARM ON. HIS BED WHEELS ARE LOCKED. WILL 
CONTINUE WITH FREQUENT ROUNDING.

## 2023-01-27 VITALS — DIASTOLIC BLOOD PRESSURE: 53 MMHG | SYSTOLIC BLOOD PRESSURE: 88 MMHG

## 2023-01-27 VITALS — DIASTOLIC BLOOD PRESSURE: 63 MMHG | SYSTOLIC BLOOD PRESSURE: 91 MMHG

## 2023-01-27 VITALS — DIASTOLIC BLOOD PRESSURE: 65 MMHG | SYSTOLIC BLOOD PRESSURE: 94 MMHG

## 2023-01-27 LAB
BUN SERPL-MCNC: 30 MG/DL (ref 7–18)
CHLORIDE SERPL-SCNC: 102 MMOL/L (ref 98–107)
CO2 SERPL-SCNC: 28 MMOL/L (ref 21–32)
CREAT SERPL-MCNC: 0.8 MG/DL (ref 0.6–1.3)
GLUCOSE SERPL-MCNC: 105 MG/DL (ref 74–106)
HCT VFR BLD AUTO: 26.8 % (ref 36.7–47.1)
MAGNESIUM SERPL-MCNC: 1.8 MG/DL (ref 1.8–2.4)
MCH RBC QN AUTO: 30.3 UUG (ref 23.8–33.4)
MCV RBC AUTO: 88.3 FL (ref 73–96.2)
PHOSPHATE SERPL-MCNC: 2 MG/DL (ref 2.5–4.9)
PLATELET # BLD AUTO: 171 K/UL (ref 152–348)
POTASSIUM SERPL-SCNC: 4 MMOL/L (ref 3.5–5.1)

## 2023-01-27 RX ADMIN — OLANZAPINE SCH MG: 2.5 TABLET ORAL at 20:43

## 2023-01-27 RX ADMIN — Medication SCH ML: at 17:15

## 2023-01-27 RX ADMIN — DIVALPROEX SODIUM SCH MG: 125 CAPSULE ORAL at 17:10

## 2023-01-27 RX ADMIN — PANTOPRAZOLE SODIUM SCH MG: 40 TABLET, DELAYED RELEASE ORAL at 06:51

## 2023-01-27 RX ADMIN — THERA TABS SCH UDTAB: TAB at 09:19

## 2023-01-27 RX ADMIN — FERROUS SULFATE TAB 325 MG (65 MG ELEMENTAL FE) SCH MG: 325 (65 FE) TAB at 20:40

## 2023-01-27 RX ADMIN — Medication SCH ML: at 09:18

## 2023-01-27 RX ADMIN — DIVALPROEX SODIUM SCH MG: 125 CAPSULE ORAL at 09:17

## 2023-01-27 RX ADMIN — DIVALPROEX SODIUM SCH MG: 125 CAPSULE ORAL at 12:29

## 2023-01-27 RX ADMIN — FERROUS SULFATE TAB 325 MG (65 MG ELEMENTAL FE) SCH MG: 325 (65 FE) TAB at 09:18

## 2023-01-27 RX ADMIN — SENNOSIDES PRN TAB: 8.6 TABLET, COATED ORAL at 20:40

## 2023-01-27 RX ADMIN — VENLAFAXINE HYDROCHLORIDE SCH MG: 150 CAPSULE, EXTENDED RELEASE ORAL at 12:31

## 2023-01-27 NOTE — NUR
Gps/Lvn- Patient's daughter Lorri came to  car keys(Champ key) , ok'd  by patient. 
Patient waved hello to her daughter by the main door . Lorri(daughter) was able to initial 
belonging list ([facility])

-------------------------------------------------------------------------------

Addendum: 01/27/23 at 1621 by RUBA GALAVIZ LVN

-------------------------------------------------------------------------------

Error charted on a wrong patient

## 2023-01-27 NOTE — NUR
SAUL Family Contact:



SAUL contacted pt' niece, Magdalena ([phone]) and left a second voicemail for a call back 
regarding pt's discharge plan. SAUL will continue to follow-up with Magdalena.

## 2023-01-27 NOTE — NUR
Patient slept for approx 7.30. through the night. # abrasions in forehead were cleaned with 
NS and covered with bandage. patient's B/P is 90/56 and pulse is 64bpm patient is in no 
distress. Saline locked on his rt forearm was flushed. It is patent and intact. Will 
continue to monitor.

## 2023-01-27 NOTE — NUR
Gps/Lvn- Remains up in his josé-chair for safety, fluids offered and encouraged.Monitored 
vital signs.

## 2023-01-28 VITALS — DIASTOLIC BLOOD PRESSURE: 65 MMHG | SYSTOLIC BLOOD PRESSURE: 101 MMHG

## 2023-01-28 VITALS — DIASTOLIC BLOOD PRESSURE: 69 MMHG | SYSTOLIC BLOOD PRESSURE: 98 MMHG

## 2023-01-28 VITALS — DIASTOLIC BLOOD PRESSURE: 68 MMHG | SYSTOLIC BLOOD PRESSURE: 98 MMHG

## 2023-01-28 LAB
BUN SERPL-MCNC: 23 MG/DL (ref 7–18)
CHLORIDE SERPL-SCNC: 103 MMOL/L (ref 98–107)
CO2 SERPL-SCNC: 31 MMOL/L (ref 21–32)
CREAT SERPL-MCNC: 0.8 MG/DL (ref 0.6–1.3)
GLUCOSE SERPL-MCNC: 103 MG/DL (ref 74–106)
HCT VFR BLD AUTO: 29 % (ref 36.7–47.1)
MAGNESIUM SERPL-MCNC: 1.9 MG/DL (ref 1.8–2.4)
MCH RBC QN AUTO: 30.5 UUG (ref 23.8–33.4)
MCV RBC AUTO: 88.4 FL (ref 73–96.2)
PHOSPHATE SERPL-MCNC: 3.2 MG/DL (ref 2.5–4.9)
PLATELET # BLD AUTO: 215 K/UL (ref 152–348)
POTASSIUM SERPL-SCNC: 4.3 MMOL/L (ref 3.5–5.1)

## 2023-01-28 RX ADMIN — VENLAFAXINE HYDROCHLORIDE SCH MG: 150 CAPSULE, EXTENDED RELEASE ORAL at 12:52

## 2023-01-28 RX ADMIN — Medication SCH ML: at 09:28

## 2023-01-28 RX ADMIN — PANTOPRAZOLE SODIUM SCH MG: 40 TABLET, DELAYED RELEASE ORAL at 06:00

## 2023-01-28 RX ADMIN — DIVALPROEX SODIUM SCH MG: 125 CAPSULE ORAL at 12:52

## 2023-01-28 RX ADMIN — DIVALPROEX SODIUM SCH MG: 125 CAPSULE ORAL at 16:56

## 2023-01-28 RX ADMIN — FERROUS SULFATE TAB 325 MG (65 MG ELEMENTAL FE) SCH MG: 325 (65 FE) TAB at 20:41

## 2023-01-28 RX ADMIN — Medication SCH ML: at 16:57

## 2023-01-28 RX ADMIN — THERA TABS SCH UDTAB: TAB at 09:27

## 2023-01-28 RX ADMIN — DIVALPROEX SODIUM SCH MG: 125 CAPSULE ORAL at 09:25

## 2023-01-28 RX ADMIN — OLANZAPINE SCH MG: 2.5 TABLET ORAL at 20:41

## 2023-01-28 RX ADMIN — FERROUS SULFATE TAB 325 MG (65 MG ELEMENTAL FE) SCH MG: 325 (65 FE) TAB at 09:25

## 2023-01-28 NOTE — NUR
Patient has hannah showing improvement in oral intake of food. No BM noted for many days. 
Dulcolax suppository given with small results. Stool noted directly inside the rectum. 
Patient tolerated the procedure well. Babita care provided and a Mepilex was applied to the 
coccyx for protection. No skin breakdown noted. Safety Stratiges are in place. The patient  
denied SI and made a verbal contract for safety with this writer. Continuing to monitor 
intake and output and assist with needs as they arise.

## 2023-01-28 NOTE — NUR
Gps/Nursing- Encouraged to feed self, assisted with her meals, fluids offered and 
encouraged, compliant with pm routine meds, interacts when engaged, answers to simple 
question . Stayed in bed most of the morning

## 2023-01-29 VITALS — DIASTOLIC BLOOD PRESSURE: 68 MMHG | SYSTOLIC BLOOD PRESSURE: 101 MMHG

## 2023-01-29 VITALS — DIASTOLIC BLOOD PRESSURE: 47 MMHG | SYSTOLIC BLOOD PRESSURE: 91 MMHG

## 2023-01-29 VITALS — DIASTOLIC BLOOD PRESSURE: 70 MMHG | SYSTOLIC BLOOD PRESSURE: 96 MMHG

## 2023-01-29 RX ADMIN — DIVALPROEX SODIUM SCH MG: 125 CAPSULE ORAL at 12:32

## 2023-01-29 RX ADMIN — THERA TABS SCH UDTAB: TAB at 08:41

## 2023-01-29 RX ADMIN — VENLAFAXINE HYDROCHLORIDE SCH MG: 150 CAPSULE, EXTENDED RELEASE ORAL at 12:32

## 2023-01-29 RX ADMIN — PANTOPRAZOLE SODIUM SCH MG: 40 TABLET, DELAYED RELEASE ORAL at 06:05

## 2023-01-29 RX ADMIN — DIVALPROEX SODIUM SCH MG: 125 CAPSULE ORAL at 08:41

## 2023-01-29 RX ADMIN — FERROUS SULFATE TAB 325 MG (65 MG ELEMENTAL FE) SCH MG: 325 (65 FE) TAB at 21:23

## 2023-01-29 RX ADMIN — PANTOPRAZOLE SODIUM SCH MG: 40 TABLET, DELAYED RELEASE ORAL at 06:24

## 2023-01-29 RX ADMIN — OLANZAPINE SCH MG: 2.5 TABLET ORAL at 21:23

## 2023-01-29 RX ADMIN — Medication SCH ML: at 08:42

## 2023-01-29 RX ADMIN — FERROUS SULFATE TAB 325 MG (65 MG ELEMENTAL FE) SCH MG: 325 (65 FE) TAB at 08:42

## 2023-01-29 RX ADMIN — Medication SCH ML: at 17:14

## 2023-01-29 RX ADMIN — DIVALPROEX SODIUM SCH MG: 125 CAPSULE ORAL at 17:13

## 2023-01-29 NOTE — NUR
Gps/Lvn-Kept patient in his group activity, poor initiation, assited with his meals, fluids 
offered and encouraged, hesitancy in taking his routine meds. , flat guarded , answers to 
simple questions, one step commands

## 2023-01-29 NOTE — NUR
Patient has been up and down during the night. Multiple bowel movements. Frequent turning 
and repositioning done to prevent skin breakdown. Shower given this am. Safety Stratiges are 
in place. Patient is denying SI at this time. PO food and fluid encouraged.

## 2023-01-30 VITALS — DIASTOLIC BLOOD PRESSURE: 63 MMHG | SYSTOLIC BLOOD PRESSURE: 94 MMHG

## 2023-01-30 VITALS — DIASTOLIC BLOOD PRESSURE: 49 MMHG | SYSTOLIC BLOOD PRESSURE: 92 MMHG

## 2023-01-30 VITALS — DIASTOLIC BLOOD PRESSURE: 69 MMHG | SYSTOLIC BLOOD PRESSURE: 95 MMHG

## 2023-01-30 VITALS — SYSTOLIC BLOOD PRESSURE: 98 MMHG | DIASTOLIC BLOOD PRESSURE: 64 MMHG

## 2023-01-30 RX ADMIN — FERROUS SULFATE TAB 325 MG (65 MG ELEMENTAL FE) SCH MG: 325 (65 FE) TAB at 20:52

## 2023-01-30 RX ADMIN — PANTOPRAZOLE SODIUM SCH MG: 40 TABLET, DELAYED RELEASE ORAL at 06:06

## 2023-01-30 RX ADMIN — PANTOPRAZOLE SODIUM SCH MG: 40 TABLET, DELAYED RELEASE ORAL at 06:02

## 2023-01-30 RX ADMIN — DIVALPROEX SODIUM SCH MG: 125 CAPSULE ORAL at 08:45

## 2023-01-30 RX ADMIN — FERROUS SULFATE TAB 325 MG (65 MG ELEMENTAL FE) SCH MG: 325 (65 FE) TAB at 08:45

## 2023-01-30 RX ADMIN — THERA TABS SCH UDTAB: TAB at 08:45

## 2023-01-30 RX ADMIN — VENLAFAXINE HYDROCHLORIDE SCH MG: 150 CAPSULE, EXTENDED RELEASE ORAL at 12:31

## 2023-01-30 RX ADMIN — Medication SCH ML: at 08:46

## 2023-01-30 RX ADMIN — Medication SCH ML: at 16:18

## 2023-01-30 RX ADMIN — DIVALPROEX SODIUM SCH MG: 125 CAPSULE ORAL at 16:18

## 2023-01-30 RX ADMIN — DIVALPROEX SODIUM SCH MG: 125 CAPSULE ORAL at 12:31

## 2023-01-30 NOTE — NUR
RECEIVED PATIENT IN THE DAY ROOM. HE IS NOTED A/O X 1 TO 2. HE IS CALM AND PLEASANT UPON 
APPROACHED. PATIENT NOTED LESS ISOLATIVE LESS WITHDRAWN. HE CONTINUE WITH DELUSIONAL 
THINKING. HIS SPEECH IS DISORGANIZED. AFFECT IS BLUNTED, MOOD IS LOW. HE DENIED SI/HI/VH/AH. 
PATIENT BP IS LOW FORM  SBP OF 91 TO 95 TO 99MMHG THE HIGHEST, PT IS ASYMPTOMATIC IN NO 
DISTRESS. ZYPREXA 5MG PO QHS WAS NOT GIVEN D/T DECREASED BP. HE WAS GIVEN PO FLUIDS AND 
SNACKS. PT HAD 3 BOTTLES OF ENSURE AND GRAM CRACKERS. HE IS REASSURED FOR HIS SAFETY, SAFETY 
AND FALL PRECAUTIONS ARE IN PLACE. WILL CONTINUE TO MONITOR.

## 2023-01-30 NOTE — NUR
SW Family Contact:



SW contacted pt' niece, Magdalena ([phone]) regarding pt's update and discharge plan. 
Magdalena stated she is the DPOA for the pt and she will email this SW the paperwork. Magdalena 
stated pt currently resides at an Assisted living called [facility] ([phone]) located at [address] [place] [address]. Magdalena is aware 
and agreeable that pt may need a temporary skilled nursing facility prior to returning to 
assisted living. Psychiatrist, Dr. Yeager is aware.

## 2023-01-30 NOTE — NUR
SAUL Family Contact:



SAUL contacted pt' niece, Magdalena ([phone]) and discussed pt's discharge plan. Magdalena 
stated she is the DPOA for the pt. Magdalena provided her email for this SW to contact her for 
the DPOA paperwork. Magdalena stated pt currently resides at [facility] 
[phone] where has has lived for 2 years. Magdalena is agreeable to a temporary skilled 
nursing facility for the pt if recommended by the psychiatrist prior to returning to [facility]. Magdalena also provided an additional contact from the facility, [facility] ([phone]).  
Magdalena stated she lives in [place] but the pt does have siblings in [place], [place]. SAUL 
informed Mgadalena this writer will continue to stay in contact with Magdalena. SW informed Magdalena 
on a few details per nursing regarding pt's treatment update. Magdalena was grateful.

## 2023-01-30 NOTE — NUR
SAUL Discharge Update:



Pt currently resides at an Assisted living called [facility] 
([phone]) located at [address] [place] [address]. SAUL spoke with pt's 
nurse, Dhara at the assisted [facility] regarding pt's return upon discharge. Dhara is aware 
that pt may need a temporary skilled nursing facility prior to return to [facility]. Dhara 
agrees and stated she will need to evaluate pt prior to returning back to [facility]. 
Psychiatrist, Dr. Yeager is aware and recommended pt to [facility] for temporary 
continuation of care post discharge from [facility]. [facility] pharmacy: [facility] 
([phone]). Assisted living fax: [phone].

## 2023-01-30 NOTE — NUR
Patient has been paranoid and hearing voices since the start of the shift. Much education 
and encouragement was needed  in order for the patient to take his PM medications. The 
patient has been refusing fluid and snack tonight. Multiple diaper changes, and frequent 
paula care provided. Optifoam dressing applied to bony coccyx to prevent breakdown. Safety 
Stratiges are in place and ongoing monitoring of PO intake and increase in paranoia. No SI 
at this time.

## 2023-01-30 NOTE — NUR
GPS: Nursing Notes: Mood Disturbance: Depression:

Patient is awake and responding to his name, depressed mood and blunted affect, low energy 
level, no interactions with peers, isolative and withdrawn at times, minimal participation 
in therapeutic groups, unable to formulate a viable plan for self care, denies SI, verbally 
katie for safety, continue to monitor for safety, ambulatory with fww and assistance, 
unsteady and weak gait, appetite is poor, unkempt appearance, resistant with nursing care at 
times, redirected during shift, gets easily irritable when redirected, continue with 
treatment plan.

## 2023-01-31 VITALS — DIASTOLIC BLOOD PRESSURE: 53 MMHG | SYSTOLIC BLOOD PRESSURE: 91 MMHG

## 2023-01-31 VITALS — DIASTOLIC BLOOD PRESSURE: 77 MMHG | SYSTOLIC BLOOD PRESSURE: 105 MMHG

## 2023-01-31 VITALS — DIASTOLIC BLOOD PRESSURE: 55 MMHG | SYSTOLIC BLOOD PRESSURE: 92 MMHG

## 2023-01-31 RX ADMIN — PANTOPRAZOLE SODIUM SCH MG: 40 TABLET, DELAYED RELEASE ORAL at 06:18

## 2023-01-31 RX ADMIN — Medication SCH ML: at 08:11

## 2023-01-31 RX ADMIN — DIVALPROEX SODIUM SCH MG: 125 CAPSULE ORAL at 16:36

## 2023-01-31 RX ADMIN — Medication PRN MG: at 16:36

## 2023-01-31 RX ADMIN — Medication SCH ML: at 16:36

## 2023-01-31 RX ADMIN — LORAZEPAM PRN MG: 0.5 TABLET ORAL at 16:36

## 2023-01-31 RX ADMIN — THERA TABS SCH UDTAB: TAB at 08:11

## 2023-01-31 RX ADMIN — VENLAFAXINE HYDROCHLORIDE SCH MG: 150 CAPSULE, EXTENDED RELEASE ORAL at 12:37

## 2023-01-31 RX ADMIN — FERROUS SULFATE TAB 325 MG (65 MG ELEMENTAL FE) SCH MG: 325 (65 FE) TAB at 20:23

## 2023-01-31 RX ADMIN — DIVALPROEX SODIUM SCH MG: 125 CAPSULE ORAL at 08:11

## 2023-01-31 RX ADMIN — DIVALPROEX SODIUM SCH MG: 125 CAPSULE ORAL at 12:37

## 2023-01-31 RX ADMIN — OLANZAPINE SCH MG: 2.5 TABLET ORAL at 20:23

## 2023-01-31 RX ADMIN — FERROUS SULFATE TAB 325 MG (65 MG ELEMENTAL FE) SCH MG: 325 (65 FE) TAB at 08:11

## 2023-01-31 NOTE — NUR
GPS: Nursing Notes: Mood Disturbance: Depression:

Patient is awake and responding to his name, depressed mood, flat affect, poor appetite, 
resistant with nursing care,  no interactions with peers, needs a lot of prompting to 
participate in therapeutic groups, low energy level, unable to formulate a viable plan for 
self care, isolative and withdrawn at times, continue to monitor for safety, continue with 
treatment plan.

## 2023-01-31 NOTE — NUR
RECEIVED PATIENT IN THE HALLWAY SITTING IN A MILLER CHAIR. HE IS NOTED A/O X 1.  HE IS 
HYPERVERBAL WITH DELUSIONAL THINKING. HE IS NONSENSICAL AT TIMES, HIS SPEECH IS 
DISORGANIZED. AFFECT IS BLUNTED, MOOD IS LOW. HE IS REASSURED FOR HIS SAFETY. V/S ARE 
STABLE.  PT HAD 1 BOTTLES OF ENSURE. IV SALINE LOCKED PLACED IN RIGHT FOREARM IS INTACT AND 
PATENT.  OPTIFOAM DRESSING ON COCCYX TO PREVENT SKIN BREAKDOWN IS IN PLACE. SAFETY AND FALL 
PRECAUTIONS ARE IN PLACE. ALL HIS NEEDS ARE MET. WILL CONTINUE TO MONITOR.

## 2023-02-01 VITALS — DIASTOLIC BLOOD PRESSURE: 50 MMHG | SYSTOLIC BLOOD PRESSURE: 90 MMHG

## 2023-02-01 VITALS — DIASTOLIC BLOOD PRESSURE: 52 MMHG | SYSTOLIC BLOOD PRESSURE: 88 MMHG

## 2023-02-01 VITALS — SYSTOLIC BLOOD PRESSURE: 97 MMHG | DIASTOLIC BLOOD PRESSURE: 69 MMHG

## 2023-02-01 LAB
FERRITIN SERPL-MCNC: 411 NG/ML (ref 26–388)
IRON SERPL-MCNC: 48 UG/DL (ref 50–175)

## 2023-02-01 RX ADMIN — PANTOPRAZOLE SODIUM SCH MG: 40 TABLET, DELAYED RELEASE ORAL at 07:00

## 2023-02-01 RX ADMIN — DIVALPROEX SODIUM SCH MG: 125 CAPSULE ORAL at 12:20

## 2023-02-01 RX ADMIN — DIVALPROEX SODIUM SCH MG: 125 CAPSULE ORAL at 09:00

## 2023-02-01 RX ADMIN — OLANZAPINE SCH MG: 2.5 TABLET ORAL at 20:32

## 2023-02-01 RX ADMIN — Medication SCH ML: at 08:48

## 2023-02-01 RX ADMIN — DIVALPROEX SODIUM SCH MG: 125 CAPSULE ORAL at 08:47

## 2023-02-01 RX ADMIN — VENLAFAXINE HYDROCHLORIDE SCH MG: 150 CAPSULE, EXTENDED RELEASE ORAL at 12:20

## 2023-02-01 RX ADMIN — DIVALPROEX SODIUM SCH MG: 125 CAPSULE ORAL at 17:38

## 2023-02-01 RX ADMIN — FERROUS SULFATE TAB 325 MG (65 MG ELEMENTAL FE) SCH MG: 325 (65 FE) TAB at 08:47

## 2023-02-01 RX ADMIN — FERROUS SULFATE TAB 325 MG (65 MG ELEMENTAL FE) SCH MG: 325 (65 FE) TAB at 20:32

## 2023-02-01 RX ADMIN — LORAZEPAM PRN MG: 0.5 TABLET ORAL at 20:32

## 2023-02-01 RX ADMIN — FERROUS SULFATE TAB 325 MG (65 MG ELEMENTAL FE) SCH MG: 325 (65 FE) TAB at 09:00

## 2023-02-01 RX ADMIN — THERA TABS SCH UDTAB: TAB at 08:47

## 2023-02-01 RX ADMIN — Medication SCH ML: at 17:39

## 2023-02-01 RX ADMIN — THERA TABS SCH UDTAB: TAB at 09:00

## 2023-02-01 NOTE — NUR
Received patient sleeping in his room. A/O X 3 to person, place. Patient is withdrawn, 
depressed, quiet, selective and suspicious with medications, refused them this morning. 
Patient states "I'm taking too many pills. I don't need them". Patient requires minimal 
assistance with ADL. Patient is encourage to verbalize concerns. Fall and safety precautions 
implemented.

## 2023-02-02 VITALS — SYSTOLIC BLOOD PRESSURE: 77 MMHG | DIASTOLIC BLOOD PRESSURE: 52 MMHG

## 2023-02-02 VITALS — DIASTOLIC BLOOD PRESSURE: 56 MMHG | SYSTOLIC BLOOD PRESSURE: 94 MMHG

## 2023-02-02 VITALS — SYSTOLIC BLOOD PRESSURE: 90 MMHG | DIASTOLIC BLOOD PRESSURE: 56 MMHG

## 2023-02-02 RX ADMIN — FERROUS SULFATE TAB 325 MG (65 MG ELEMENTAL FE) SCH MG: 325 (65 FE) TAB at 09:37

## 2023-02-02 RX ADMIN — DIVALPROEX SODIUM SCH MG: 125 CAPSULE ORAL at 12:45

## 2023-02-02 RX ADMIN — Medication SCH ML: at 09:37

## 2023-02-02 RX ADMIN — THERA TABS SCH UDTAB: TAB at 09:37

## 2023-02-02 RX ADMIN — DIVALPROEX SODIUM SCH MG: 125 CAPSULE ORAL at 09:36

## 2023-02-02 RX ADMIN — VENLAFAXINE HYDROCHLORIDE SCH MG: 150 CAPSULE, EXTENDED RELEASE ORAL at 12:45

## 2023-02-02 RX ADMIN — PANTOPRAZOLE SODIUM SCH MG: 40 TABLET, DELAYED RELEASE ORAL at 07:16

## 2023-02-02 RX ADMIN — DIVALPROEX SODIUM SCH MG: 125 CAPSULE ORAL at 17:35

## 2023-02-02 RX ADMIN — Medication SCH ML: at 17:35

## 2023-02-02 RX ADMIN — FERROUS SULFATE TAB 325 MG (65 MG ELEMENTAL FE) SCH MG: 325 (65 FE) TAB at 20:35

## 2023-02-03 VITALS — SYSTOLIC BLOOD PRESSURE: 84 MMHG | DIASTOLIC BLOOD PRESSURE: 50 MMHG

## 2023-02-03 VITALS — DIASTOLIC BLOOD PRESSURE: 60 MMHG | SYSTOLIC BLOOD PRESSURE: 90 MMHG

## 2023-02-03 VITALS — SYSTOLIC BLOOD PRESSURE: 90 MMHG | DIASTOLIC BLOOD PRESSURE: 64 MMHG

## 2023-02-03 RX ADMIN — OLANZAPINE SCH MG: 5 TABLET ORAL at 20:24

## 2023-02-03 RX ADMIN — THERA TABS SCH UDTAB: TAB at 09:25

## 2023-02-03 RX ADMIN — DIVALPROEX SODIUM SCH MG: 125 CAPSULE ORAL at 09:26

## 2023-02-03 RX ADMIN — Medication SCH ML: at 09:27

## 2023-02-03 RX ADMIN — FERROUS SULFATE TAB 325 MG (65 MG ELEMENTAL FE) SCH MG: 325 (65 FE) TAB at 20:24

## 2023-02-03 RX ADMIN — FERROUS SULFATE TAB 325 MG (65 MG ELEMENTAL FE) SCH MG: 325 (65 FE) TAB at 09:25

## 2023-02-03 RX ADMIN — DIVALPROEX SODIUM SCH MG: 125 CAPSULE ORAL at 17:38

## 2023-02-03 RX ADMIN — VENLAFAXINE HYDROCHLORIDE SCH MG: 150 CAPSULE, EXTENDED RELEASE ORAL at 13:00

## 2023-02-03 RX ADMIN — PANTOPRAZOLE SODIUM SCH MG: 40 TABLET, DELAYED RELEASE ORAL at 06:36

## 2023-02-03 RX ADMIN — Medication SCH ML: at 17:39

## 2023-02-03 RX ADMIN — DIVALPROEX SODIUM SCH MG: 125 CAPSULE ORAL at 13:00

## 2023-02-03 NOTE — NUR
Gps/Lvn- Stayed up in his recliner chair during the day, assisted with his meals, fluids 
encouraged, offered, compliant with routine medications . Stayed in his group activity , 
interacts when engaged.

## 2023-02-03 NOTE — NUR
SAUL Family Contact:



SW contacted pt' niece, Magdalena ([phone]) and discussed pt's discharge plan to [facility] ([phone]) [address] [place] [address] on Monday. Magdalena 
stated she will be providing the DPOA paperwork again by email today. SAUL will continue to 
update Magdalena as needed. Magdalena is agreeable and grateful for everything.

## 2023-02-03 NOTE — NUR
GPS:   Pt.slept 6.30 last night. Remains isolative,withdrawn but denies wanting to harm 
self. Fluids taken adequately. Med.compliant. Fall precautions observed. Needs attended. 
Incontinence care provided.

## 2023-02-04 VITALS — DIASTOLIC BLOOD PRESSURE: 55 MMHG | SYSTOLIC BLOOD PRESSURE: 98 MMHG

## 2023-02-04 VITALS — DIASTOLIC BLOOD PRESSURE: 63 MMHG | SYSTOLIC BLOOD PRESSURE: 101 MMHG

## 2023-02-04 VITALS — SYSTOLIC BLOOD PRESSURE: 90 MMHG | DIASTOLIC BLOOD PRESSURE: 60 MMHG

## 2023-02-04 RX ADMIN — VENLAFAXINE HYDROCHLORIDE SCH MG: 150 CAPSULE, EXTENDED RELEASE ORAL at 12:34

## 2023-02-04 RX ADMIN — DIVALPROEX SODIUM SCH MG: 125 CAPSULE ORAL at 08:17

## 2023-02-04 RX ADMIN — Medication PRN MG: at 12:34

## 2023-02-04 RX ADMIN — Medication SCH ML: at 16:29

## 2023-02-04 RX ADMIN — PANTOPRAZOLE SODIUM SCH MG: 40 TABLET, DELAYED RELEASE ORAL at 06:02

## 2023-02-04 RX ADMIN — Medication SCH ML: at 08:18

## 2023-02-04 RX ADMIN — THERA TABS SCH UDTAB: TAB at 08:17

## 2023-02-04 RX ADMIN — OLANZAPINE SCH MG: 5 TABLET ORAL at 20:27

## 2023-02-04 RX ADMIN — DIVALPROEX SODIUM SCH MG: 125 CAPSULE ORAL at 16:29

## 2023-02-04 RX ADMIN — ANORECTAL OINTMENT PRN APPLIC: 15.7; .44; 24; 20.6 OINTMENT TOPICAL at 06:02

## 2023-02-04 RX ADMIN — FERROUS SULFATE TAB 325 MG (65 MG ELEMENTAL FE) SCH MG: 325 (65 FE) TAB at 08:17

## 2023-02-04 RX ADMIN — FERROUS SULFATE TAB 325 MG (65 MG ELEMENTAL FE) SCH MG: 325 (65 FE) TAB at 20:27

## 2023-02-04 RX ADMIN — DIVALPROEX SODIUM SCH MG: 125 CAPSULE ORAL at 12:34

## 2023-02-04 RX ADMIN — ANORECTAL OINTMENT PRN APPLIC: 15.7; .44; 24; 20.6 OINTMENT TOPICAL at 01:45

## 2023-02-04 NOTE — NUR
RECEIVED PATIENT IN HIS ROOM IN BED. HE IS AWAKE NOTED A/O X 2. HE IS CALM AND PLEASANT UPON 
APPROACHED. PATIENT IS NOTED LESS PARANOID LESS DELUSIONAL. HE IS ABLE TO MAKE EYE CONTACT 
WITH THIS WRITER AND ABLE TO  VERBALIZED HIS FEELINGS.  HIS AFFECT IS BLUNTED, MOOD IS LOW. 
HE DENIED SI/HI/VI/AH. HE IS REASSURED FOR HIS SAFETY. V/S ARE STABLE.  PT HAD 2 BOTTLES OF 
ENSURE PLUS AND SOME GRAM CRACKERS. HE IS EATING BETTER.  OPTIFOAM DRESSING ON COCCYX TO 
PREVENT SKIN BREAKDOWN IS IN PLACE. SAFETY AND FALL PRECAUTIONS ARE IN PLACE. ALL HIS NEEDS 
ARE MET. WILL CONTINUE TO MONITOR.

## 2023-02-04 NOTE — NUR
GPS:   Pt.slept 6.30 last night. Isolative,passive and remains delusional. Re-assured and 
re-directed. Denies SI. Fall precautions observed. Fluids/diet kathy.well.

## 2023-02-04 NOTE — NUR
GPS: Nursing Notes: Mood Disturbance: Depression:

Patient is awake and responding to her name, disoriented, depressed mood and flat affect, 
needs assistance with ADL's, low energy level, resistant with nursing care, gets easily 
irritable when redirected, unable to formulate a viable plan for self care, continue to 
monitor for safety, continue with treatment plan.

## 2023-02-05 VITALS — DIASTOLIC BLOOD PRESSURE: 62 MMHG | SYSTOLIC BLOOD PRESSURE: 90 MMHG

## 2023-02-05 VITALS — SYSTOLIC BLOOD PRESSURE: 105 MMHG | DIASTOLIC BLOOD PRESSURE: 75 MMHG

## 2023-02-05 VITALS — DIASTOLIC BLOOD PRESSURE: 53 MMHG | SYSTOLIC BLOOD PRESSURE: 91 MMHG

## 2023-02-05 LAB
BUN SERPL-MCNC: 28 MG/DL (ref 7–18)
CHLORIDE SERPL-SCNC: 106 MMOL/L (ref 98–107)
CO2 SERPL-SCNC: 32 MMOL/L (ref 21–32)
CREAT SERPL-MCNC: 0.9 MG/DL (ref 0.6–1.3)
GLUCOSE SERPL-MCNC: 99 MG/DL (ref 74–106)
POTASSIUM SERPL-SCNC: 4.1 MMOL/L (ref 3.5–5.1)

## 2023-02-05 RX ADMIN — DIVALPROEX SODIUM SCH MG: 125 CAPSULE ORAL at 12:26

## 2023-02-05 RX ADMIN — OLANZAPINE SCH MG: 5 TABLET ORAL at 20:14

## 2023-02-05 RX ADMIN — MAGNESIUM HYDROXIDE PRN ML: 400 SUSPENSION ORAL at 20:14

## 2023-02-05 RX ADMIN — Medication PRN MG: at 08:34

## 2023-02-05 RX ADMIN — THERA TABS SCH UDTAB: TAB at 08:34

## 2023-02-05 RX ADMIN — PANTOPRAZOLE SODIUM SCH MG: 40 TABLET, DELAYED RELEASE ORAL at 06:52

## 2023-02-05 RX ADMIN — DIVALPROEX SODIUM SCH MG: 125 CAPSULE ORAL at 08:34

## 2023-02-05 RX ADMIN — Medication SCH ML: at 16:12

## 2023-02-05 RX ADMIN — DIVALPROEX SODIUM SCH MG: 125 CAPSULE ORAL at 16:12

## 2023-02-05 RX ADMIN — FERROUS SULFATE TAB 325 MG (65 MG ELEMENTAL FE) SCH MG: 325 (65 FE) TAB at 08:34

## 2023-02-05 RX ADMIN — VENLAFAXINE HYDROCHLORIDE SCH MG: 150 CAPSULE, EXTENDED RELEASE ORAL at 12:26

## 2023-02-05 RX ADMIN — FERROUS SULFATE TAB 325 MG (65 MG ELEMENTAL FE) SCH MG: 325 (65 FE) TAB at 20:15

## 2023-02-05 RX ADMIN — Medication SCH ML: at 08:34

## 2023-02-05 NOTE — NUR
GPS: Nursing Notes: Mood Disturbance: Depression:

Patient is awake and responding to his name, no interactions with peers, isolative and 
withdrawn in his room, impaired judgment, resistant with nursing care, unable to formulate a 
viable plan for self care, depressed mood and flat affect, low energy level, resistant with 
nursing care, denies SI, continue to monitor for safety, continue with treatment plan.

## 2023-02-05 NOTE — NUR
RECEIVED PATIENT IN HIS ROOM IN BED. HE IS NOTED AWAKE A/O X 2. HE IS CALM AND PLEASANT UPON 
APPROACHED. PATIENT IS ABLE TO MAKE EYE CONTACT WITH THIS WRITER AND ABLE TO  VERBALIZED HIS 
FEELINGS.  HIS AFFECT IS BLUNTED, MOOD IS LOW. HE DENIED SI/HI/VI/AH. PT IS AWARE OF HIS 
IMPENDING DISCHARGED. HE WAS GIVEN MOM TO HELP HIM HAVE A BM. HE IS REASSURED FOR HIS 
SAFETY. V/S ARE STABLE.  PT HAD 1 BOTTLES OF ENSURE PLUS. HE IS COMPLIANT WITH HIS [facility] 
MEDICATION REGIMENT.  OPTIFOAM DRESSING ON COCCYX TO PREVENT SKIN BREAKDOWN IS IN PLACE. 
SAFETY AND FALL PRECAUTIONS ARE IN PLACE. ALL HIS NEEDS ARE MET. WILL CONTINUE TO MONITOR.

## 2023-02-06 VITALS — DIASTOLIC BLOOD PRESSURE: 65 MMHG | SYSTOLIC BLOOD PRESSURE: 82 MMHG

## 2023-02-06 VITALS — SYSTOLIC BLOOD PRESSURE: 104 MMHG | DIASTOLIC BLOOD PRESSURE: 72 MMHG

## 2023-02-06 VITALS — SYSTOLIC BLOOD PRESSURE: 77 MMHG | DIASTOLIC BLOOD PRESSURE: 51 MMHG

## 2023-02-06 VITALS — DIASTOLIC BLOOD PRESSURE: 62 MMHG | SYSTOLIC BLOOD PRESSURE: 92 MMHG

## 2023-02-06 VITALS — DIASTOLIC BLOOD PRESSURE: 61 MMHG | SYSTOLIC BLOOD PRESSURE: 102 MMHG

## 2023-02-06 VITALS — DIASTOLIC BLOOD PRESSURE: 65 MMHG | SYSTOLIC BLOOD PRESSURE: 104 MMHG

## 2023-02-06 LAB
ALP SERPL-CCNC: 60 U/L (ref 50–136)
ALT SERPL W/O P-5'-P-CCNC: 41 U/L (ref 16–63)
APPEARANCE UR: CLEAR
AST SERPL-CCNC: 24 U/L (ref 15–37)
BILIRUB SERPL-MCNC: 0.6 MG/DL (ref 0.2–1)
BILIRUB UR QL STRIP: NEGATIVE
BUN SERPL-MCNC: 26 MG/DL (ref 7–18)
CHLORIDE SERPL-SCNC: 107 MMOL/L (ref 98–107)
CO2 SERPL-SCNC: 31 MMOL/L (ref 21–32)
COLOR UR: YELLOW
CREAT SERPL-MCNC: 0.8 MG/DL (ref 0.6–1.3)
GLUCOSE SERPL-MCNC: 122 MG/DL (ref 74–106)
GLUCOSE UR STRIP-MCNC: NEGATIVE MG/DL
HCT VFR BLD AUTO: 33.9 % (ref 36.7–47.1)
HGB UR QL STRIP: NEGATIVE
KETONES UR STRIP-MCNC: NEGATIVE MG/DL
LEUKOCYTE ESTERASE UR QL STRIP: NEGATIVE
MAGNESIUM SERPL-MCNC: 2.3 MG/DL (ref 1.8–2.4)
MCH RBC QN AUTO: 30.2 UUG (ref 23.8–33.4)
MCV RBC AUTO: 90.9 FL (ref 73–96.2)
NITRITE UR QL STRIP: NEGATIVE
PH UR STRIP: 6.5 [PH] (ref 5–8)
PLATELET # BLD AUTO: 311 K/UL (ref 152–348)
POTASSIUM SERPL-SCNC: 4 MMOL/L (ref 3.5–5.1)
SP GR UR STRIP: 1.01 (ref 1–1.03)
UROBILINOGEN UR STRIP-MCNC: 0.2 E.U./DL
WS STN SPEC: 6.6 G/DL (ref 6.4–8.2)

## 2023-02-06 RX ADMIN — PANTOPRAZOLE SODIUM SCH MG: 40 TABLET, DELAYED RELEASE ORAL at 06:31

## 2023-02-06 RX ADMIN — DIVALPROEX SODIUM SCH MG: 125 CAPSULE ORAL at 12:22

## 2023-02-06 RX ADMIN — VENLAFAXINE HYDROCHLORIDE SCH MG: 150 CAPSULE, EXTENDED RELEASE ORAL at 12:22

## 2023-02-06 RX ADMIN — FERROUS SULFATE TAB 325 MG (65 MG ELEMENTAL FE) SCH MG: 325 (65 FE) TAB at 20:00

## 2023-02-06 RX ADMIN — TEMAZEPAM PRN MG: 7.5 CAPSULE ORAL at 22:16

## 2023-02-06 RX ADMIN — OLANZAPINE SCH MG: 5 TABLET ORAL at 20:01

## 2023-02-06 RX ADMIN — Medication SCH ML: at 08:22

## 2023-02-06 RX ADMIN — THERA TABS SCH UDTAB: TAB at 08:22

## 2023-02-06 RX ADMIN — DIVALPROEX SODIUM SCH MG: 125 CAPSULE ORAL at 08:22

## 2023-02-06 RX ADMIN — FERROUS SULFATE TAB 325 MG (65 MG ELEMENTAL FE) SCH MG: 325 (65 FE) TAB at 08:22

## 2023-02-06 RX ADMIN — Medication SCH ML: at 16:35

## 2023-02-06 RX ADMIN — DIVALPROEX SODIUM SCH MG: 125 CAPSULE ORAL at 16:35

## 2023-02-06 NOTE — NUR
GPS: Nursing Notes: Mood Disturbance: Depression:

Patient is awake and responding to his name, depressed mood and flat affect, minimal 
interactions with staff, isolative and withdrawn in his room, minimal participation in 
therapeutic groups, unkempt appearance, resistant with nursing care, poor  grooming, unable 
to formulate a viable plan for self carer, continue to monitor for safety, continue with 
treatment plan.

## 2023-02-06 NOTE — NUR
GPS: Nursing Notes: Low B/P:

At 09:45 patient's B/P =77/51, Dr. Yeager informed of patient's condition, and discharge 
was canceled. Benigno Delgado NP informed of low B/P and ordered UA, CBC, and 2 bags of NS via 
IV bolus, second bag finished at this time, orthostatic B/P: Lying = 99/65, sitting = 96/61, 
and standing = 104/62. Benigno Delgado informed of orthostatic B/P, and ordered to remove 
heplock and encourage fluid intake. Patient continue to respond to verbal commands with 
brighter affect, continue to monitor for safety, continue with treatment plan.

## 2023-02-06 NOTE — NUR
SAUL Family Contact:



SW contacted pt' niece, Magdalena ([phone]) and informed her that the pt's discharge plan 
to [facility] ([phone]) [address] [place] [address] is 
postponed until further notice due to pt's low blood pressure. Magdalena is aware and grateful 
for the update.

## 2023-02-07 ENCOUNTER — HOSPITAL ENCOUNTER (INPATIENT)
Dept: HOSPITAL 12 - MEDSURG3 | Age: 73
LOS: 7 days | Discharge: NURSING, REHAB OR LONG-TERM CARE | DRG: 314 | End: 2023-02-14
Payer: MEDICARE

## 2023-02-07 VITALS — DIASTOLIC BLOOD PRESSURE: 49 MMHG | SYSTOLIC BLOOD PRESSURE: 76 MMHG

## 2023-02-07 VITALS — SYSTOLIC BLOOD PRESSURE: 98 MMHG | DIASTOLIC BLOOD PRESSURE: 68 MMHG

## 2023-02-07 VITALS — SYSTOLIC BLOOD PRESSURE: 81 MMHG | DIASTOLIC BLOOD PRESSURE: 51 MMHG

## 2023-02-07 VITALS — DIASTOLIC BLOOD PRESSURE: 52 MMHG | SYSTOLIC BLOOD PRESSURE: 90 MMHG

## 2023-02-07 VITALS — HEIGHT: 70 IN | WEIGHT: 108.03 LBS | BODY MASS INDEX: 15.47 KG/M2

## 2023-02-07 DIAGNOSIS — F25.0: ICD-10-CM

## 2023-02-07 DIAGNOSIS — Z88.0: ICD-10-CM

## 2023-02-07 DIAGNOSIS — E88.09: ICD-10-CM

## 2023-02-07 DIAGNOSIS — F29: ICD-10-CM

## 2023-02-07 DIAGNOSIS — K21.9: ICD-10-CM

## 2023-02-07 DIAGNOSIS — F03.90: ICD-10-CM

## 2023-02-07 DIAGNOSIS — Z87.828: ICD-10-CM

## 2023-02-07 DIAGNOSIS — E43: ICD-10-CM

## 2023-02-07 DIAGNOSIS — I95.9: Primary | ICD-10-CM

## 2023-02-07 DIAGNOSIS — D50.9: ICD-10-CM

## 2023-02-07 DIAGNOSIS — F41.1: ICD-10-CM

## 2023-02-07 DIAGNOSIS — K59.00: ICD-10-CM

## 2023-02-07 DIAGNOSIS — Z91.51: ICD-10-CM

## 2023-02-07 DIAGNOSIS — E87.1: ICD-10-CM

## 2023-02-07 DIAGNOSIS — E27.40: ICD-10-CM

## 2023-02-07 DIAGNOSIS — R13.10: ICD-10-CM

## 2023-02-07 DIAGNOSIS — K57.90: ICD-10-CM

## 2023-02-07 DIAGNOSIS — E86.0: ICD-10-CM

## 2023-02-07 DIAGNOSIS — F32.A: ICD-10-CM

## 2023-02-07 LAB
BUN SERPL-MCNC: 22 MG/DL (ref 7–18)
CHLORIDE SERPL-SCNC: 111 MMOL/L (ref 98–107)
CO2 SERPL-SCNC: 28 MMOL/L (ref 21–32)
CREAT SERPL-MCNC: 0.7 MG/DL (ref 0.6–1.3)
GLUCOSE SERPL-MCNC: 86 MG/DL (ref 74–106)
HCT VFR BLD AUTO: 26.8 % (ref 36.7–47.1)
MCH RBC QN AUTO: 30.7 UUG (ref 23.8–33.4)
MCV RBC AUTO: 90 FL (ref 73–96.2)
PLATELET # BLD AUTO: 182 K/UL (ref 152–348)
POTASSIUM SERPL-SCNC: 3.8 MMOL/L (ref 3.5–5.1)

## 2023-02-07 PROCEDURE — G0378 HOSPITAL OBSERVATION PER HR: HCPCS

## 2023-02-07 PROCEDURE — A6213 FOAM DRG >16<=48 SQ IN W/BDR: HCPCS

## 2023-02-07 PROCEDURE — A4663 DIALYSIS BLOOD PRESSURE CUFF: HCPCS

## 2023-02-07 PROCEDURE — A6209 FOAM DRSG <=16 SQ IN W/O BDR: HCPCS

## 2023-02-07 RX ADMIN — LORAZEPAM PRN MG: 0.5 TABLET ORAL at 00:30

## 2023-02-07 RX ADMIN — DIVALPROEX SODIUM SCH MG: 125 CAPSULE ORAL at 08:40

## 2023-02-07 RX ADMIN — DIVALPROEX SODIUM SCH MG: 125 CAPSULE ORAL at 12:25

## 2023-02-07 RX ADMIN — THERA TABS SCH UDTAB: TAB at 08:40

## 2023-02-07 RX ADMIN — PANTOPRAZOLE SODIUM SCH MG: 40 TABLET, DELAYED RELEASE ORAL at 06:10

## 2023-02-07 RX ADMIN — VENLAFAXINE HYDROCHLORIDE SCH MG: 150 CAPSULE, EXTENDED RELEASE ORAL at 12:24

## 2023-02-07 RX ADMIN — DIVALPROEX SODIUM SCH MG: 125 CAPSULE ORAL at 17:08

## 2023-02-07 RX ADMIN — FERROUS SULFATE TAB 325 MG (65 MG ELEMENTAL FE) SCH MG: 325 (65 FE) TAB at 08:40

## 2023-02-07 RX ADMIN — Medication SCH ML: at 17:09

## 2023-02-07 RX ADMIN — Medication SCH ML: at 08:40

## 2023-02-07 NOTE — NUR
Admitted patient in Tele floor,  from [facility] for dx of GI Bleed, Poor po intake, under the care 
of Amilcar Krause NP,, patient alert oriented, but underweight, with low BP, body check was 
done with multiple bruise on R/L upper extremities, chest, back, sacrum, generalized body 
skin dryness, facial redness/rashes, stated he's weak on lower extremities. Patient appears 
calm and cooperative at this time, oriented to call lights and to room, cont to monitor.

## 2023-02-07 NOTE — NUR
GPS: Nursing Notes: Discharge to 3rd. Floor:

Patient is awake and responding to his name, cooperative with nursing care, compliant with 
his medications, denies SI/HI, denies AH/VH, denies pain or discomfort, denies SOB, Per Dr. Yeager, discontinue 2482, discharge to 3rd. floor - Med Surg. Room # 312. Kelby Krause, 
GOPAL - Admitting Dx: Possible GI Bleeding, report given to admitting nurse - JUDY Puentes. 
Patient took all his belonging with him.

## 2023-02-07 NOTE — NUR
GPS: Nursing Notes: Mood Disturbance: Depression:

Patient is awake and responding to his name, cooperative with nursing care, depressed mood, 
blunted affect, interactive with staff, participating in therapeutic groups, argumentative 
at times, but following staff directions, unable to formulate a viable plan for self carer, 
unkempt appearance, continue with treatment plan.

## 2023-02-07 NOTE — NUR
GPS NOTES: Patient is restless and trying to get out gerichair for safety. Constant 
redirection needed, non-directable. Patent no concept of reality, ATIVAN given and 
Temazepam. Patient slept 2.15H. No distress noted. Safety strategies in placed.

## 2023-02-08 VITALS — DIASTOLIC BLOOD PRESSURE: 81 MMHG | SYSTOLIC BLOOD PRESSURE: 130 MMHG

## 2023-02-08 VITALS — SYSTOLIC BLOOD PRESSURE: 94 MMHG | DIASTOLIC BLOOD PRESSURE: 58 MMHG

## 2023-02-08 VITALS — SYSTOLIC BLOOD PRESSURE: 101 MMHG | DIASTOLIC BLOOD PRESSURE: 60 MMHG

## 2023-02-08 VITALS — SYSTOLIC BLOOD PRESSURE: 87 MMHG | DIASTOLIC BLOOD PRESSURE: 56 MMHG

## 2023-02-08 VITALS — SYSTOLIC BLOOD PRESSURE: 89 MMHG | DIASTOLIC BLOOD PRESSURE: 53 MMHG

## 2023-02-08 LAB
ALP SERPL-CCNC: 46 U/L (ref 50–136)
ALT SERPL W/O P-5'-P-CCNC: 26 U/L (ref 16–63)
AST SERPL-CCNC: 14 U/L (ref 15–37)
BILIRUB SERPL-MCNC: 0.4 MG/DL (ref 0.2–1)
BUN SERPL-MCNC: 31 MG/DL (ref 7–18)
CHLORIDE SERPL-SCNC: 113 MMOL/L (ref 98–107)
CO2 SERPL-SCNC: 27 MMOL/L (ref 21–32)
CREAT SERPL-MCNC: 0.7 MG/DL (ref 0.6–1.3)
FERRITIN SERPL-MCNC: 349 NG/ML (ref 26–388)
GLUCOSE SERPL-MCNC: 91 MG/DL (ref 74–106)
HCT VFR BLD AUTO: 24.8 % (ref 36.7–47.1)
IRON SERPL-MCNC: 24 UG/DL (ref 50–175)
MCH RBC QN AUTO: 30.7 UUG (ref 23.8–33.4)
MCV RBC AUTO: 91.5 FL (ref 73–96.2)
PLATELET # BLD AUTO: 191 K/UL (ref 152–348)
POTASSIUM SERPL-SCNC: 4.1 MMOL/L (ref 3.5–5.1)
WS STN SPEC: 5.1 G/DL (ref 6.4–8.2)

## 2023-02-08 RX ADMIN — OLANZAPINE SCH MG: 5 TABLET ORAL at 21:19

## 2023-02-08 RX ADMIN — DIVALPROEX SODIUM SCH MG: 125 CAPSULE ORAL at 12:57

## 2023-02-08 RX ADMIN — Medication SCH ML: at 09:44

## 2023-02-08 RX ADMIN — ANORECTAL OINTMENT SCH GM: 15.7; .44; 24; 20.6 OINTMENT TOPICAL at 09:37

## 2023-02-08 RX ADMIN — VENLAFAXINE HYDROCHLORIDE SCH MG: 150 CAPSULE, EXTENDED RELEASE ORAL at 12:57

## 2023-02-08 RX ADMIN — PANTOPRAZOLE SODIUM SCH MG: 40 TABLET, DELAYED RELEASE ORAL at 06:14

## 2023-02-08 RX ADMIN — LEVOTHYROXINE SODIUM SCH MCG: 25 TABLET ORAL at 11:54

## 2023-02-08 RX ADMIN — DIVALPROEX SODIUM SCH MG: 125 CAPSULE ORAL at 08:31

## 2023-02-08 RX ADMIN — DIVALPROEX SODIUM SCH MG: 125 CAPSULE ORAL at 17:01

## 2023-02-08 RX ADMIN — THERA TABS SCH UDTAB: TAB at 08:31

## 2023-02-08 RX ADMIN — FERROUS SULFATE TAB 325 MG (65 MG ELEMENTAL FE) SCH MG: 325 (65 FE) TAB at 08:31

## 2023-02-08 RX ADMIN — DRONABINOL SCH MG: 2.5 CAPSULE ORAL at 17:00

## 2023-02-08 RX ADMIN — DRONABINOL SCH MG: 2.5 CAPSULE ORAL at 11:54

## 2023-02-08 RX ADMIN — ANORECTAL OINTMENT SCH GM: 15.7; .44; 24; 20.6 OINTMENT TOPICAL at 21:20

## 2023-02-08 RX ADMIN — Medication SCH ML: at 17:01

## 2023-02-08 RX ADMIN — FERROUS SULFATE TAB 325 MG (65 MG ELEMENTAL FE) SCH MG: 325 (65 FE) TAB at 21:16

## 2023-02-08 NOTE — NUR
CALLED THE RADIOLOGY RE WHEN THE CT ABDOMEN AND PELVIS WILL BE DONE AND THEY STATED WILL BE 
HERE SOON TO TAKE HIM

## 2023-02-08 NOTE — NUR
SW TRANSFER NOTE:



Pt was discharged from [facility] on 2/7/23 to med floor due to gi bleed per report. Prior to 
discharge from [facility], pt has an accepting facility to [facility] located at 
[address] [place] [address] [phone] confirmed by Rhea wilson 
([phone]) upon discharge. Pt has an actively involved DPOA who is the pt's niece, 
Magdalena ([phone]). Magdalena is aware and agreeable with the pt's discharge plan as she 
requested for pt to be close to family.

## 2023-02-08 NOTE — NUR
SW TRANSFER NOTE:



Pt was discharged from [facility] on 2/7/23 to med floor due to GI bleed per report. Prior to 
discharge from U, pt has an accepting facility to [facility] located at 
[address] [place] [address] [phone] confirmed by Rhea wilson 
([phone]) upon discharge. Pt has an actively involved DPOA who is the pt's niece, 
Magdalena ([phone]). Magdalena is aware and agreeable with the pt's discharge plan as she 
requested for pt to be close to family.

## 2023-02-08 NOTE — NUR
WOUND CARE CONSULT: PT PRESENTS AS VERY THIN AND BONY WITH SACRAL BONY AREA WITH BLANCHABLE 
REDNESS,PRESENT ON ADMISSION. RECOMMENDATIONS MADE FOR SKIN PROTECTION. DISCUSSED WITH 
NURSING STAFF.MD IN AGREEMENT WITH PLAN OF CARE.

## 2023-02-09 VITALS — DIASTOLIC BLOOD PRESSURE: 70 MMHG | SYSTOLIC BLOOD PRESSURE: 102 MMHG

## 2023-02-09 VITALS — DIASTOLIC BLOOD PRESSURE: 69 MMHG | SYSTOLIC BLOOD PRESSURE: 101 MMHG

## 2023-02-09 VITALS — DIASTOLIC BLOOD PRESSURE: 39 MMHG | SYSTOLIC BLOOD PRESSURE: 96 MMHG

## 2023-02-09 VITALS — SYSTOLIC BLOOD PRESSURE: 87 MMHG | DIASTOLIC BLOOD PRESSURE: 60 MMHG

## 2023-02-09 VITALS — DIASTOLIC BLOOD PRESSURE: 77 MMHG | SYSTOLIC BLOOD PRESSURE: 112 MMHG

## 2023-02-09 LAB
ALP SERPL-CCNC: 42 U/L (ref 50–136)
ALT SERPL W/O P-5'-P-CCNC: 24 U/L (ref 16–63)
AST SERPL-CCNC: 22 U/L (ref 15–37)
BILIRUB DIRECT SERPL-MCNC: 0.2 MG/DL (ref 0–0.2)
BILIRUB SERPL-MCNC: 0.7 MG/DL (ref 0.2–1)
BUN SERPL-MCNC: 21 MG/DL (ref 7–18)
CHLORIDE SERPL-SCNC: 110 MMOL/L (ref 98–107)
CO2 SERPL-SCNC: 29 MMOL/L (ref 21–32)
CREAT SERPL-MCNC: 0.8 MG/DL (ref 0.6–1.3)
GLUCOSE SERPL-MCNC: 77 MG/DL (ref 74–106)
HCT VFR BLD AUTO: 27.3 % (ref 36.7–47.1)
MAGNESIUM SERPL-MCNC: 1.9 MG/DL (ref 1.8–2.4)
MCH RBC QN AUTO: 30.9 UUG (ref 23.8–33.4)
MCV RBC AUTO: 91.3 FL (ref 73–96.2)
PHOSPHATE SERPL-MCNC: 3.5 MG/DL (ref 2.5–4.9)
PLATELET # BLD AUTO: 196 K/UL (ref 152–348)
POTASSIUM SERPL-SCNC: 3.8 MMOL/L (ref 3.5–5.1)
WS STN SPEC: 5.3 G/DL (ref 6.4–8.2)

## 2023-02-09 RX ADMIN — DRONABINOL SCH MG: 2.5 CAPSULE ORAL at 11:23

## 2023-02-09 RX ADMIN — OLANZAPINE SCH MG: 5 TABLET ORAL at 21:24

## 2023-02-09 RX ADMIN — DOCUSATE SODIUM SCH MG: 100 CAPSULE, LIQUID FILLED ORAL at 21:23

## 2023-02-09 RX ADMIN — DIVALPROEX SODIUM SCH MG: 125 CAPSULE ORAL at 16:37

## 2023-02-09 RX ADMIN — DRONABINOL SCH MG: 2.5 CAPSULE ORAL at 16:37

## 2023-02-09 RX ADMIN — SENNOSIDES SCH TAB: 8.6 TABLET, COATED ORAL at 13:20

## 2023-02-09 RX ADMIN — THERA TABS SCH UDTAB: TAB at 08:52

## 2023-02-09 RX ADMIN — PANTOPRAZOLE SODIUM SCH MG: 40 TABLET, DELAYED RELEASE ORAL at 06:31

## 2023-02-09 RX ADMIN — Medication SCH ML: at 08:54

## 2023-02-09 RX ADMIN — LEVOTHYROXINE SODIUM SCH MCG: 25 TABLET ORAL at 06:31

## 2023-02-09 RX ADMIN — VENLAFAXINE HYDROCHLORIDE SCH MG: 150 CAPSULE, EXTENDED RELEASE ORAL at 12:06

## 2023-02-09 RX ADMIN — Medication SCH ML: at 16:38

## 2023-02-09 RX ADMIN — ANORECTAL OINTMENT SCH APPLIC: 15.7; .44; 24; 20.6 OINTMENT TOPICAL at 21:24

## 2023-02-09 RX ADMIN — FERROUS SULFATE TAB 325 MG (65 MG ELEMENTAL FE) SCH MG: 325 (65 FE) TAB at 08:52

## 2023-02-09 RX ADMIN — DIVALPROEX SODIUM SCH MG: 125 CAPSULE ORAL at 12:21

## 2023-02-09 RX ADMIN — FERROUS SULFATE TAB 325 MG (65 MG ELEMENTAL FE) SCH MG: 325 (65 FE) TAB at 21:23

## 2023-02-09 RX ADMIN — ANORECTAL OINTMENT SCH GM: 15.7; .44; 24; 20.6 OINTMENT TOPICAL at 08:54

## 2023-02-09 RX ADMIN — DIVALPROEX SODIUM SCH MG: 125 CAPSULE ORAL at 08:52

## 2023-02-09 NOTE — NUR
SAUL DPOA Contact:



SAUL spoke with pt's DPOA who is the pt's niece, Magdalena ([phone]). Magdalena requested via 
email for staff to trim his hand and feet nails. Magdalena also requested for pt to be put in 
clean clothes and not kept in a gown for the duration of the hospitalization. Magdalena was 
informed by this SW that he is still on the 3rd floor. This SW informed Magdalena that the 
third floor is aware of pt's discharge plan to [facility] [place] [address]) upon discharge confirmed by Paris wilson 
[phone] and Rhea ([phone]).

## 2023-02-09 NOTE — NUR
PATIENT HAS A DISCHARGE ORDER BUT PER THE  PATIENT HAS NO PLACEMENT AT THIS TIME 
WILL SEARCH TOMORROW FOR PLACEMENT.

## 2023-02-10 VITALS — SYSTOLIC BLOOD PRESSURE: 82 MMHG | DIASTOLIC BLOOD PRESSURE: 53 MMHG

## 2023-02-10 VITALS — SYSTOLIC BLOOD PRESSURE: 81 MMHG | DIASTOLIC BLOOD PRESSURE: 56 MMHG

## 2023-02-10 VITALS — SYSTOLIC BLOOD PRESSURE: 91 MMHG | DIASTOLIC BLOOD PRESSURE: 45 MMHG

## 2023-02-10 VITALS — DIASTOLIC BLOOD PRESSURE: 56 MMHG | SYSTOLIC BLOOD PRESSURE: 87 MMHG

## 2023-02-10 VITALS — SYSTOLIC BLOOD PRESSURE: 84 MMHG | DIASTOLIC BLOOD PRESSURE: 54 MMHG

## 2023-02-10 VITALS — DIASTOLIC BLOOD PRESSURE: 57 MMHG | SYSTOLIC BLOOD PRESSURE: 86 MMHG

## 2023-02-10 VITALS — SYSTOLIC BLOOD PRESSURE: 102 MMHG | DIASTOLIC BLOOD PRESSURE: 59 MMHG

## 2023-02-10 LAB
BUN SERPL-MCNC: 20 MG/DL (ref 7–18)
CHLORIDE SERPL-SCNC: 109 MMOL/L (ref 98–107)
CO2 SERPL-SCNC: 29 MMOL/L (ref 21–32)
CREAT SERPL-MCNC: 0.8 MG/DL (ref 0.6–1.3)
GLUCOSE SERPL-MCNC: 102 MG/DL (ref 74–106)
HCT VFR BLD AUTO: 30.2 % (ref 36.7–47.1)
MAGNESIUM SERPL-MCNC: 2 MG/DL (ref 1.8–2.4)
MCH RBC QN AUTO: 30.1 UUG (ref 23.8–33.4)
MCV RBC AUTO: 92.3 FL (ref 73–96.2)
PHOSPHATE SERPL-MCNC: 3.5 MG/DL (ref 2.5–4.9)
PLATELET # BLD AUTO: 218 K/UL (ref 152–348)
POTASSIUM SERPL-SCNC: 3.9 MMOL/L (ref 3.5–5.1)

## 2023-02-10 RX ADMIN — DOCUSATE SODIUM SCH MG: 100 CAPSULE, LIQUID FILLED ORAL at 20:45

## 2023-02-10 RX ADMIN — LEVOTHYROXINE SODIUM SCH MCG: 25 TABLET ORAL at 06:17

## 2023-02-10 RX ADMIN — MIDODRINE HYDROCHLORIDE SCH MG: 2.5 TABLET ORAL at 22:07

## 2023-02-10 RX ADMIN — Medication SCH ML: at 17:17

## 2023-02-10 RX ADMIN — DIVALPROEX SODIUM SCH MG: 125 CAPSULE ORAL at 12:42

## 2023-02-10 RX ADMIN — SODIUM CHLORIDE PRN MLS/HR: 0.9 INJECTION, SOLUTION INTRAVENOUS at 12:41

## 2023-02-10 RX ADMIN — THERA TABS SCH UDTAB: TAB at 08:59

## 2023-02-10 RX ADMIN — MIDODRINE HYDROCHLORIDE SCH MG: 2.5 TABLET ORAL at 21:06

## 2023-02-10 RX ADMIN — DIVALPROEX SODIUM SCH MG: 125 CAPSULE ORAL at 17:17

## 2023-02-10 RX ADMIN — FERROUS SULFATE TAB 325 MG (65 MG ELEMENTAL FE) SCH MG: 325 (65 FE) TAB at 08:58

## 2023-02-10 RX ADMIN — FERROUS SULFATE TAB 325 MG (65 MG ELEMENTAL FE) SCH MG: 325 (65 FE) TAB at 20:45

## 2023-02-10 RX ADMIN — MIDODRINE HYDROCHLORIDE SCH MG: 2.5 TABLET ORAL at 11:30

## 2023-02-10 RX ADMIN — ANORECTAL OINTMENT SCH APPLIC: 15.7; .44; 24; 20.6 OINTMENT TOPICAL at 20:46

## 2023-02-10 RX ADMIN — Medication SCH ML: at 08:59

## 2023-02-10 RX ADMIN — Medication SCH ML: at 14:30

## 2023-02-10 RX ADMIN — SENNOSIDES SCH TAB: 8.6 TABLET, COATED ORAL at 08:59

## 2023-02-10 RX ADMIN — OLANZAPINE SCH MG: 5 TABLET ORAL at 20:45

## 2023-02-10 RX ADMIN — DIVALPROEX SODIUM SCH MG: 125 CAPSULE ORAL at 08:58

## 2023-02-10 RX ADMIN — VENLAFAXINE HYDROCHLORIDE SCH MG: 150 CAPSULE, EXTENDED RELEASE ORAL at 12:42

## 2023-02-10 RX ADMIN — PANTOPRAZOLE SODIUM SCH MG: 40 TABLET, DELAYED RELEASE ORAL at 06:17

## 2023-02-10 RX ADMIN — ANORECTAL OINTMENT SCH APPLIC: 15.7; .44; 24; 20.6 OINTMENT TOPICAL at 08:59

## 2023-02-10 NOTE — NUR
BP 77/48, patient reports feeling weak and dizzy.  IV bolus started. DC cancelled, 
spoke with Debbie from [facility] about discharge cancellation.Will continue to 
monitor

## 2023-02-10 NOTE — NUR
Latest /59, HR 67. Patient with poor appetite but drinking the supplement. IVF 
maintained, infusing well. Incontinence care done skin care. Repositioned comfortably.

## 2023-02-11 VITALS — DIASTOLIC BLOOD PRESSURE: 57 MMHG | SYSTOLIC BLOOD PRESSURE: 108 MMHG

## 2023-02-11 VITALS — DIASTOLIC BLOOD PRESSURE: 70 MMHG | SYSTOLIC BLOOD PRESSURE: 112 MMHG

## 2023-02-11 VITALS — SYSTOLIC BLOOD PRESSURE: 78 MMHG | DIASTOLIC BLOOD PRESSURE: 51 MMHG

## 2023-02-11 VITALS — DIASTOLIC BLOOD PRESSURE: 45 MMHG | SYSTOLIC BLOOD PRESSURE: 82 MMHG

## 2023-02-11 VITALS — SYSTOLIC BLOOD PRESSURE: 105 MMHG | DIASTOLIC BLOOD PRESSURE: 52 MMHG

## 2023-02-11 VITALS — DIASTOLIC BLOOD PRESSURE: 61 MMHG | SYSTOLIC BLOOD PRESSURE: 98 MMHG

## 2023-02-11 VITALS — SYSTOLIC BLOOD PRESSURE: 121 MMHG | DIASTOLIC BLOOD PRESSURE: 82 MMHG

## 2023-02-11 VITALS — DIASTOLIC BLOOD PRESSURE: 55 MMHG | SYSTOLIC BLOOD PRESSURE: 87 MMHG

## 2023-02-11 LAB
BUN SERPL-MCNC: 18 MG/DL (ref 7–18)
CHLORIDE SERPL-SCNC: 111 MMOL/L (ref 98–107)
CO2 SERPL-SCNC: 27 MMOL/L (ref 21–32)
CREAT SERPL-MCNC: 0.8 MG/DL (ref 0.6–1.3)
GLUCOSE SERPL-MCNC: 84 MG/DL (ref 74–106)
HCT VFR BLD AUTO: 28.7 % (ref 36.7–47.1)
MAGNESIUM SERPL-MCNC: 1.9 MG/DL (ref 1.8–2.4)
MCH RBC QN AUTO: 30.9 UUG (ref 23.8–33.4)
MCV RBC AUTO: 91.3 FL (ref 73–96.2)
PHOSPHATE SERPL-MCNC: 3.5 MG/DL (ref 2.5–4.9)
PLATELET # BLD AUTO: 223 K/UL (ref 152–348)
POTASSIUM SERPL-SCNC: 3.8 MMOL/L (ref 3.5–5.1)

## 2023-02-11 RX ADMIN — SENNOSIDES SCH TAB: 8.6 TABLET, COATED ORAL at 08:37

## 2023-02-11 RX ADMIN — MIDODRINE HYDROCHLORIDE SCH MG: 2.5 TABLET ORAL at 13:21

## 2023-02-11 RX ADMIN — OLANZAPINE SCH MG: 5 TABLET ORAL at 20:59

## 2023-02-11 RX ADMIN — SODIUM CHLORIDE PRN MLS/HR: 0.9 INJECTION, SOLUTION INTRAVENOUS at 03:20

## 2023-02-11 RX ADMIN — Medication SCH ML: at 09:02

## 2023-02-11 RX ADMIN — VENLAFAXINE HYDROCHLORIDE SCH MG: 150 CAPSULE, EXTENDED RELEASE ORAL at 13:13

## 2023-02-11 RX ADMIN — DIVALPROEX SODIUM SCH MG: 125 CAPSULE ORAL at 08:37

## 2023-02-11 RX ADMIN — DOCUSATE SODIUM SCH MG: 100 CAPSULE, LIQUID FILLED ORAL at 20:59

## 2023-02-11 RX ADMIN — FERROUS SULFATE TAB 325 MG (65 MG ELEMENTAL FE) SCH MG: 325 (65 FE) TAB at 20:59

## 2023-02-11 RX ADMIN — DIVALPROEX SODIUM SCH MG: 125 CAPSULE ORAL at 16:34

## 2023-02-11 RX ADMIN — LEVOTHYROXINE SODIUM SCH MCG: 25 TABLET ORAL at 06:08

## 2023-02-11 RX ADMIN — MIDODRINE HYDROCHLORIDE SCH MG: 5 TABLET ORAL at 21:24

## 2023-02-11 RX ADMIN — ANORECTAL OINTMENT SCH APPLIC: 15.7; .44; 24; 20.6 OINTMENT TOPICAL at 20:59

## 2023-02-11 RX ADMIN — ANORECTAL OINTMENT SCH APPLIC: 15.7; .44; 24; 20.6 OINTMENT TOPICAL at 08:46

## 2023-02-11 RX ADMIN — Medication SCH ML: at 16:34

## 2023-02-11 RX ADMIN — MIDODRINE HYDROCHLORIDE SCH MG: 2.5 TABLET ORAL at 05:18

## 2023-02-11 RX ADMIN — PANTOPRAZOLE SODIUM SCH MG: 40 TABLET, DELAYED RELEASE ORAL at 06:08

## 2023-02-11 RX ADMIN — DIVALPROEX SODIUM SCH MG: 125 CAPSULE ORAL at 13:13

## 2023-02-11 RX ADMIN — FLUDROCORTISONE ACETATE SCH MG: 0.1 TABLET ORAL at 09:26

## 2023-02-11 RX ADMIN — FERROUS SULFATE TAB 325 MG (65 MG ELEMENTAL FE) SCH MG: 325 (65 FE) TAB at 08:37

## 2023-02-11 RX ADMIN — THERA TABS SCH UDTAB: TAB at 08:43

## 2023-02-11 NOTE — NUR
BLOOD PRESSURE AT THIS TIME IS 78/51 AND RECHECKED AND ITS 88/52 JULIO C SAVAGE NP NOTIFIED 
WITH ORDERS FOR IVF BOLUS AND TO INCREASE THE DOSE OF THE MIDODRINE AND NOTED.PATIENT IS 
ASSYMPTOMATIC DENIES FEELING DIZZY WILL CONTINUE TO OBSERVE.

## 2023-02-11 NOTE — NUR
NOTIFIED PATIENT THAT SHE HAS AN ORDER FOR LACTULOSE TO ASSIST HER WITH HAVING A BOWEL 
MOVEMENT AND HE STATED THAT HE WILL NOT TAKE IT NOW WILL WAITH TILL THIS AFTERNOON AND WILL 
TAKE IT ID=F HE DID NOT HAVE A BOWEL MOVEMENT BY THEN BASED ON THE FACT THAT HE ALREADY TOOL 
SENOKOT AND MILK OS MAGNESIA.

## 2023-02-12 VITALS — SYSTOLIC BLOOD PRESSURE: 93 MMHG | DIASTOLIC BLOOD PRESSURE: 57 MMHG

## 2023-02-12 VITALS — DIASTOLIC BLOOD PRESSURE: 66 MMHG | SYSTOLIC BLOOD PRESSURE: 100 MMHG

## 2023-02-12 VITALS — DIASTOLIC BLOOD PRESSURE: 55 MMHG | SYSTOLIC BLOOD PRESSURE: 93 MMHG

## 2023-02-12 VITALS — SYSTOLIC BLOOD PRESSURE: 98 MMHG | DIASTOLIC BLOOD PRESSURE: 57 MMHG

## 2023-02-12 LAB
BUN SERPL-MCNC: 16 MG/DL (ref 7–18)
CHLORIDE SERPL-SCNC: 110 MMOL/L (ref 98–107)
CO2 SERPL-SCNC: 28 MMOL/L (ref 21–32)
CREAT SERPL-MCNC: 0.7 MG/DL (ref 0.6–1.3)
GLUCOSE SERPL-MCNC: 92 MG/DL (ref 74–106)
HCT VFR BLD AUTO: 28.4 % (ref 36.7–47.1)
MAGNESIUM SERPL-MCNC: 2.2 MG/DL (ref 1.8–2.4)
MCH RBC QN AUTO: 30.7 UUG (ref 23.8–33.4)
MCV RBC AUTO: 91.9 FL (ref 73–96.2)
PHOSPHATE SERPL-MCNC: 2.5 MG/DL (ref 2.5–4.9)
PLATELET # BLD AUTO: 239 K/UL (ref 152–348)
POTASSIUM SERPL-SCNC: 4.1 MMOL/L (ref 3.5–5.1)

## 2023-02-12 RX ADMIN — THERA TABS SCH UDTAB: TAB at 08:26

## 2023-02-12 RX ADMIN — SENNOSIDES SCH TAB: 8.6 TABLET, COATED ORAL at 08:26

## 2023-02-12 RX ADMIN — VENLAFAXINE HYDROCHLORIDE SCH MG: 150 CAPSULE, EXTENDED RELEASE ORAL at 12:17

## 2023-02-12 RX ADMIN — DIVALPROEX SODIUM SCH MG: 125 CAPSULE ORAL at 12:17

## 2023-02-12 RX ADMIN — MIDODRINE HYDROCHLORIDE SCH MG: 5 TABLET ORAL at 06:01

## 2023-02-12 RX ADMIN — MIDODRINE HYDROCHLORIDE SCH MG: 5 TABLET ORAL at 13:14

## 2023-02-12 RX ADMIN — MIDODRINE HYDROCHLORIDE SCH MG: 5 TABLET ORAL at 22:29

## 2023-02-12 RX ADMIN — OLANZAPINE SCH MG: 5 TABLET ORAL at 20:39

## 2023-02-12 RX ADMIN — FLUDROCORTISONE ACETATE SCH MG: 0.1 TABLET ORAL at 08:27

## 2023-02-12 RX ADMIN — ANORECTAL OINTMENT SCH APPLIC: 15.7; .44; 24; 20.6 OINTMENT TOPICAL at 08:59

## 2023-02-12 RX ADMIN — PANTOPRAZOLE SODIUM SCH MG: 40 TABLET, DELAYED RELEASE ORAL at 06:10

## 2023-02-12 RX ADMIN — Medication SCH ML: at 09:00

## 2023-02-12 RX ADMIN — DIVALPROEX SODIUM SCH MG: 125 CAPSULE ORAL at 08:27

## 2023-02-12 RX ADMIN — DIVALPROEX SODIUM SCH MG: 125 CAPSULE ORAL at 16:36

## 2023-02-12 RX ADMIN — LEVOTHYROXINE SODIUM SCH MCG: 25 TABLET ORAL at 06:01

## 2023-02-12 RX ADMIN — DOCUSATE SODIUM SCH MG: 100 CAPSULE, LIQUID FILLED ORAL at 20:38

## 2023-02-12 RX ADMIN — FERROUS SULFATE TAB 325 MG (65 MG ELEMENTAL FE) SCH MG: 325 (65 FE) TAB at 08:27

## 2023-02-12 RX ADMIN — FERROUS SULFATE TAB 325 MG (65 MG ELEMENTAL FE) SCH MG: 325 (65 FE) TAB at 20:38

## 2023-02-12 RX ADMIN — ANORECTAL OINTMENT SCH APPLIC: 15.7; .44; 24; 20.6 OINTMENT TOPICAL at 20:39

## 2023-02-12 RX ADMIN — Medication SCH ML: at 16:36

## 2023-02-12 NOTE — NUR
2 D ECHO COMPLETED AS ORDERED AND EF IS 55-60 PERCENT WAS SEEN AND EXAMINED BY JULIO C SAVAGE WITH NEW ORDERS AND NOTED.

## 2023-02-12 NOTE — NUR
RECEIVED PATIENT IN BED AWAKE VERBALLY RESPONDS WHEN SPOKEN TO ABLE TO MAKE SIMPLE NEEDS 
KNOWN HIS BLOOD PRESSURE CONTINUES TO FLUCTUATE TO LOW NORMAL TO VERY LOW FLUIDS ENCOURGED 
WILL CONTINUE TO OBSERVE.

## 2023-02-13 VITALS — SYSTOLIC BLOOD PRESSURE: 100 MMHG | DIASTOLIC BLOOD PRESSURE: 59 MMHG

## 2023-02-13 VITALS — DIASTOLIC BLOOD PRESSURE: 57 MMHG | SYSTOLIC BLOOD PRESSURE: 95 MMHG

## 2023-02-13 VITALS — SYSTOLIC BLOOD PRESSURE: 91 MMHG | DIASTOLIC BLOOD PRESSURE: 55 MMHG

## 2023-02-13 VITALS — DIASTOLIC BLOOD PRESSURE: 47 MMHG | SYSTOLIC BLOOD PRESSURE: 92 MMHG

## 2023-02-13 LAB
BUN SERPL-MCNC: 18 MG/DL (ref 7–18)
CHLORIDE SERPL-SCNC: 109 MMOL/L (ref 98–107)
CO2 SERPL-SCNC: 30 MMOL/L (ref 21–32)
CREAT SERPL-MCNC: 0.7 MG/DL (ref 0.6–1.3)
GLUCOSE SERPL-MCNC: 89 MG/DL (ref 74–106)
HCT VFR BLD AUTO: 26.4 % (ref 36.7–47.1)
MAGNESIUM SERPL-MCNC: 2 MG/DL (ref 1.8–2.4)
MCH RBC QN AUTO: 31 UUG (ref 23.8–33.4)
MCV RBC AUTO: 91.4 FL (ref 73–96.2)
PHOSPHATE SERPL-MCNC: 2.4 MG/DL (ref 2.5–4.9)
PLATELET # BLD AUTO: 216 K/UL (ref 152–348)
POTASSIUM SERPL-SCNC: 3.7 MMOL/L (ref 3.5–5.1)

## 2023-02-13 RX ADMIN — THERA TABS SCH UDTAB: TAB at 08:52

## 2023-02-13 RX ADMIN — FLUDROCORTISONE ACETATE SCH MG: 0.1 TABLET ORAL at 16:38

## 2023-02-13 RX ADMIN — FERROUS SULFATE TAB 325 MG (65 MG ELEMENTAL FE) SCH MG: 325 (65 FE) TAB at 08:53

## 2023-02-13 RX ADMIN — Medication SCH ML: at 09:00

## 2023-02-13 RX ADMIN — LEVOTHYROXINE SODIUM SCH MCG: 25 TABLET ORAL at 06:16

## 2023-02-13 RX ADMIN — FERROUS SULFATE TAB 325 MG (65 MG ELEMENTAL FE) SCH MG: 325 (65 FE) TAB at 21:07

## 2023-02-13 RX ADMIN — MIDODRINE HYDROCHLORIDE SCH MG: 5 TABLET ORAL at 22:07

## 2023-02-13 RX ADMIN — VENLAFAXINE HYDROCHLORIDE SCH MG: 150 CAPSULE, EXTENDED RELEASE ORAL at 12:30

## 2023-02-13 RX ADMIN — MIDODRINE HYDROCHLORIDE SCH MG: 5 TABLET ORAL at 10:40

## 2023-02-13 RX ADMIN — ANORECTAL OINTMENT SCH APPLIC: 15.7; .44; 24; 20.6 OINTMENT TOPICAL at 21:08

## 2023-02-13 RX ADMIN — DIVALPROEX SODIUM SCH MG: 125 CAPSULE ORAL at 12:29

## 2023-02-13 RX ADMIN — Medication SCH ML: at 16:38

## 2023-02-13 RX ADMIN — MIDODRINE HYDROCHLORIDE SCH MG: 5 TABLET ORAL at 06:16

## 2023-02-13 RX ADMIN — FLUDROCORTISONE ACETATE SCH MG: 0.1 TABLET ORAL at 08:52

## 2023-02-13 RX ADMIN — DIVALPROEX SODIUM SCH MG: 125 CAPSULE ORAL at 16:38

## 2023-02-13 RX ADMIN — DIVALPROEX SODIUM SCH MG: 125 CAPSULE ORAL at 08:52

## 2023-02-13 RX ADMIN — MIDODRINE HYDROCHLORIDE SCH MG: 5 TABLET ORAL at 12:28

## 2023-02-13 RX ADMIN — DOCUSATE SODIUM SCH MG: 100 CAPSULE, LIQUID FILLED ORAL at 21:07

## 2023-02-13 RX ADMIN — PANTOPRAZOLE SODIUM SCH MG: 40 TABLET, DELAYED RELEASE ORAL at 06:16

## 2023-02-13 RX ADMIN — ANORECTAL OINTMENT SCH APPLIC: 15.7; .44; 24; 20.6 OINTMENT TOPICAL at 08:59

## 2023-02-13 RX ADMIN — SENNOSIDES SCH TAB: 8.6 TABLET, COATED ORAL at 08:52

## 2023-02-13 NOTE — NUR
BLOOD PRESSURE IS 79/56 RESTING IN BED PLACED ON [address] IRWIN HERE NOTIFIED 
HIM HE SAW PATIENT WITH NEW ORDERS AND NOTED.

## 2023-02-13 NOTE — NUR
JULIO C SAVAGE NP HERE AND SEEN PATIENT WITH ORDER TO GIVE MIDODRINE 10 MG PO NOW AND THEN 
TO CONTINUE Q6H ROUTINE AND NOTED.GIVEN AT THIS TIME BLOOD PRESSURE IS 93/51

## 2023-02-13 NOTE — NUR
ASLEEP IN BED EASILY AROUSABLE ON ROUNDS ON ROOM AIR WITH NO SOB CALL LIGHTS AND PERSONAL 
BELONGINGS ARE WITHIN EASY REACH AT THIS TIME WILL CONTINUE TO OBSERVE.

## 2023-02-14 VITALS — DIASTOLIC BLOOD PRESSURE: 56 MMHG | SYSTOLIC BLOOD PRESSURE: 91 MMHG

## 2023-02-14 VITALS — SYSTOLIC BLOOD PRESSURE: 94 MMHG | DIASTOLIC BLOOD PRESSURE: 48 MMHG

## 2023-02-14 VITALS — DIASTOLIC BLOOD PRESSURE: 62 MMHG | SYSTOLIC BLOOD PRESSURE: 95 MMHG

## 2023-02-14 LAB — ACTH PLAS-MCNC: 18 UG/DL (ref 6.2–19.4)

## 2023-02-14 RX ADMIN — MIDODRINE HYDROCHLORIDE SCH MG: 5 TABLET ORAL at 05:59

## 2023-02-14 RX ADMIN — LEVOTHYROXINE SODIUM SCH MCG: 25 TABLET ORAL at 06:17

## 2023-02-14 RX ADMIN — FLUDROCORTISONE ACETATE SCH MG: 0.1 TABLET ORAL at 16:54

## 2023-02-14 RX ADMIN — FLUDROCORTISONE ACETATE SCH MG: 0.1 TABLET ORAL at 08:58

## 2023-02-14 RX ADMIN — PANTOPRAZOLE SODIUM SCH MG: 40 TABLET, DELAYED RELEASE ORAL at 06:17

## 2023-02-14 RX ADMIN — FERROUS SULFATE TAB 325 MG (65 MG ELEMENTAL FE) SCH MG: 325 (65 FE) TAB at 08:58

## 2023-02-14 RX ADMIN — DIVALPROEX SODIUM SCH MG: 125 CAPSULE ORAL at 08:58

## 2023-02-14 RX ADMIN — VENLAFAXINE HYDROCHLORIDE SCH MG: 150 CAPSULE, EXTENDED RELEASE ORAL at 12:54

## 2023-02-14 RX ADMIN — ANORECTAL OINTMENT SCH APPLIC: 15.7; .44; 24; 20.6 OINTMENT TOPICAL at 08:58

## 2023-02-14 RX ADMIN — Medication SCH ML: at 09:03

## 2023-02-14 RX ADMIN — Medication SCH ML: at 16:54

## 2023-02-14 RX ADMIN — DIVALPROEX SODIUM SCH MG: 125 CAPSULE ORAL at 16:54

## 2023-02-14 RX ADMIN — THERA TABS SCH UDTAB: TAB at 08:58

## 2023-02-14 RX ADMIN — SENNOSIDES SCH TAB: 8.6 TABLET, COATED ORAL at 08:58

## 2023-02-14 RX ADMIN — DIVALPROEX SODIUM SCH MG: 125 CAPSULE ORAL at 12:54

## 2023-02-14 RX ADMIN — MIDODRINE HYDROCHLORIDE SCH MG: 5 TABLET ORAL at 13:13

## 2023-02-14 NOTE — NUR
PER THE  DISCHARGE PLANNER PATIENT WILL BE DISCHARGED TO [facility] 
[facility]HAB WILL BE PICKED UP AT 1930 COVID TEST DONE AS ORDERED PATIENT AWARE.

## 2023-02-14 NOTE — NUR
APRIL IS  DISCHARGED IN IMPROVED CONDITION; PT PICKED UP BY AMBULANCE PERSONNEL VIA 
STRETCHER; PAPER WORKS HANDED TO AMBULANCE PERSONNEL.

## 2023-02-14 NOTE — NUR
RECEIVED PATIENT IN BED AWAKE ALERT AND VERBALLY RESPONSIVE DENIES PAIN OR DISCOMFORTS AT 
THIS TIME ON ROOM AIR BLOOD PRESSURE IS STILL FLUCTUATING UP AND DOWN AND MOSTLY DOWN 
PATIENT IS FOR DISCHARGE TODAY TO [facility] PER THE  PATIENT WILL BE 
PICKED UP TODAY ABOUT 1100 WILL CHECK BLOOD PRESSURE TO ENSURE WITHIN NORMAL LIMITS.

## 2023-02-14 NOTE — NUR
PER THE  HE CANCELLED THE AMBULANCE  FOR 1100 UNKNOWN AT THIS TIME WHAT 
TIME THE AMBULANCE  WILL BE.

## 2023-02-14 NOTE — NUR
Received patient in bed awake, AAO x3. In no acute distress. IV site on RFA intact and 
patent. BP-100/59, no c/o headache, no chest pain. Slept well the whole night. Needs 
attended.